# Patient Record
Sex: FEMALE | Race: WHITE
[De-identification: names, ages, dates, MRNs, and addresses within clinical notes are randomized per-mention and may not be internally consistent; named-entity substitution may affect disease eponyms.]

---

## 2018-12-14 ENCOUNTER — HOSPITAL ENCOUNTER (INPATIENT)
Dept: HOSPITAL 75 - ICU | Age: 68
LOS: 2 days | Discharge: HOME | DRG: 247 | End: 2018-12-16
Attending: INTERNAL MEDICINE | Admitting: INTERNAL MEDICINE
Payer: COMMERCIAL

## 2018-12-14 VITALS — DIASTOLIC BLOOD PRESSURE: 89 MMHG | SYSTOLIC BLOOD PRESSURE: 162 MMHG

## 2018-12-14 VITALS — SYSTOLIC BLOOD PRESSURE: 142 MMHG | DIASTOLIC BLOOD PRESSURE: 88 MMHG

## 2018-12-14 VITALS — SYSTOLIC BLOOD PRESSURE: 152 MMHG | DIASTOLIC BLOOD PRESSURE: 109 MMHG

## 2018-12-14 VITALS — DIASTOLIC BLOOD PRESSURE: 70 MMHG | SYSTOLIC BLOOD PRESSURE: 143 MMHG

## 2018-12-14 VITALS — SYSTOLIC BLOOD PRESSURE: 167 MMHG | DIASTOLIC BLOOD PRESSURE: 91 MMHG

## 2018-12-14 VITALS — DIASTOLIC BLOOD PRESSURE: 95 MMHG | SYSTOLIC BLOOD PRESSURE: 145 MMHG

## 2018-12-14 VITALS — DIASTOLIC BLOOD PRESSURE: 160 MMHG | SYSTOLIC BLOOD PRESSURE: 180 MMHG

## 2018-12-14 VITALS — DIASTOLIC BLOOD PRESSURE: 103 MMHG | SYSTOLIC BLOOD PRESSURE: 169 MMHG

## 2018-12-14 VITALS — DIASTOLIC BLOOD PRESSURE: 81 MMHG | SYSTOLIC BLOOD PRESSURE: 149 MMHG

## 2018-12-14 VITALS — DIASTOLIC BLOOD PRESSURE: 111 MMHG | SYSTOLIC BLOOD PRESSURE: 188 MMHG

## 2018-12-14 VITALS — HEIGHT: 64 IN | WEIGHT: 178 LBS | BODY MASS INDEX: 30.39 KG/M2

## 2018-12-14 VITALS — DIASTOLIC BLOOD PRESSURE: 83 MMHG | SYSTOLIC BLOOD PRESSURE: 144 MMHG

## 2018-12-14 VITALS — SYSTOLIC BLOOD PRESSURE: 161 MMHG | DIASTOLIC BLOOD PRESSURE: 134 MMHG

## 2018-12-14 VITALS — DIASTOLIC BLOOD PRESSURE: 99 MMHG | SYSTOLIC BLOOD PRESSURE: 168 MMHG

## 2018-12-14 VITALS — SYSTOLIC BLOOD PRESSURE: 127 MMHG | DIASTOLIC BLOOD PRESSURE: 89 MMHG

## 2018-12-14 DIAGNOSIS — E78.5: ICD-10-CM

## 2018-12-14 DIAGNOSIS — I21.4: Primary | ICD-10-CM

## 2018-12-14 DIAGNOSIS — I16.0: ICD-10-CM

## 2018-12-14 DIAGNOSIS — R74.8: ICD-10-CM

## 2018-12-14 DIAGNOSIS — F17.210: ICD-10-CM

## 2018-12-14 DIAGNOSIS — I25.10: ICD-10-CM

## 2018-12-14 DIAGNOSIS — J44.9: ICD-10-CM

## 2018-12-14 LAB
ALBUMIN SERPL-MCNC: 4 GM/DL (ref 3.2–4.5)
ALP SERPL-CCNC: 91 U/L (ref 40–136)
ALT SERPL-CCNC: 31 U/L (ref 0–55)
APTT BLD: 71 SEC (ref 24–35)
BASOPHILS # BLD AUTO: 0 10^3/UL (ref 0–0.1)
BASOPHILS NFR BLD AUTO: 0 % (ref 0–10)
BILIRUB SERPL-MCNC: 1.3 MG/DL (ref 0.1–1)
BUN/CREAT SERPL: 15
CALCIUM SERPL-MCNC: 9 MG/DL (ref 8.5–10.1)
CHLORIDE SERPL-SCNC: 107 MMOL/L (ref 98–107)
CO2 SERPL-SCNC: 22 MMOL/L (ref 21–32)
CREAT SERPL-MCNC: 0.67 MG/DL (ref 0.6–1.3)
EOSINOPHIL # BLD AUTO: 0 10^3/UL (ref 0–0.3)
EOSINOPHIL NFR BLD AUTO: 0 % (ref 0–10)
ERYTHROCYTE [DISTWIDTH] IN BLOOD BY AUTOMATED COUNT: 13.7 % (ref 10–14.5)
GFR SERPLBLD BASED ON 1.73 SQ M-ARVRAT: > 60 ML/MIN
GLUCOSE SERPL-MCNC: 129 MG/DL (ref 70–105)
HCT VFR BLD CALC: 45 % (ref 35–52)
HGB BLD-MCNC: 15.3 G/DL (ref 11.5–16)
INR PPP: 1.1 (ref 0.8–1.4)
LYMPHOCYTES # BLD AUTO: 1.8 X 10^3 (ref 1–4)
LYMPHOCYTES NFR BLD AUTO: 15 % (ref 12–44)
MANUAL DIFFERENTIAL PERFORMED BLD QL: NO
MCH RBC QN AUTO: 34 PG (ref 25–34)
MCHC RBC AUTO-ENTMCNC: 34 G/DL (ref 32–36)
MCV RBC AUTO: 100 FL (ref 80–99)
MONOCYTES # BLD AUTO: 0.9 X 10^3 (ref 0–1)
MONOCYTES NFR BLD AUTO: 7 % (ref 0–12)
NEUTROPHILS # BLD AUTO: 9.3 X 10^3 (ref 1.8–7.8)
NEUTROPHILS NFR BLD AUTO: 77 % (ref 42–75)
PLATELET # BLD: 205 10^3/UL (ref 130–400)
PMV BLD AUTO: 10.2 FL (ref 7.4–10.4)
POTASSIUM SERPL-SCNC: 4 MMOL/L (ref 3.6–5)
PROT SERPL-MCNC: 7 GM/DL (ref 6.4–8.2)
PROTHROMBIN TIME: 14.3 SEC (ref 12.2–14.7)
RBC # BLD AUTO: 4.47 10^6/UL (ref 4.35–5.85)
SODIUM SERPL-SCNC: 138 MMOL/L (ref 135–145)
WBC # BLD AUTO: 12.1 10^3/UL (ref 4.3–11)

## 2018-12-14 PROCEDURE — 80053 COMPREHEN METABOLIC PANEL: CPT

## 2018-12-14 PROCEDURE — 71045 X-RAY EXAM CHEST 1 VIEW: CPT

## 2018-12-14 PROCEDURE — 36415 COLL VENOUS BLD VENIPUNCTURE: CPT

## 2018-12-14 PROCEDURE — 85730 THROMBOPLASTIN TIME PARTIAL: CPT

## 2018-12-14 PROCEDURE — 94640 AIRWAY INHALATION TREATMENT: CPT

## 2018-12-14 PROCEDURE — 85347 COAGULATION TIME ACTIVATED: CPT

## 2018-12-14 PROCEDURE — 93458 L HRT ARTERY/VENTRICLE ANGIO: CPT

## 2018-12-14 PROCEDURE — 87081 CULTURE SCREEN ONLY: CPT

## 2018-12-14 PROCEDURE — 80061 LIPID PANEL: CPT

## 2018-12-14 PROCEDURE — 93005 ELECTROCARDIOGRAM TRACING: CPT

## 2018-12-14 PROCEDURE — 83880 ASSAY OF NATRIURETIC PEPTIDE: CPT

## 2018-12-14 PROCEDURE — 85027 COMPLETE CBC AUTOMATED: CPT

## 2018-12-14 PROCEDURE — 85610 PROTHROMBIN TIME: CPT

## 2018-12-14 PROCEDURE — 85025 COMPLETE CBC W/AUTO DIFF WBC: CPT

## 2018-12-14 PROCEDURE — 84484 ASSAY OF TROPONIN QUANT: CPT

## 2018-12-14 PROCEDURE — 93306 TTE W/DOPPLER COMPLETE: CPT

## 2018-12-14 RX ADMIN — SODIUM CHLORIDE SCH MLS/HR: 900 INJECTION, SOLUTION INTRAVENOUS at 19:31

## 2018-12-14 RX ADMIN — TICAGRELOR SCH MG: 90 TABLET ORAL at 22:32

## 2018-12-14 RX ADMIN — HEPARIN SODIUM AND DEXTROSE SCH MLS/HR: 5000; 5 INJECTION INTRAVENOUS at 19:00

## 2018-12-14 RX ADMIN — SODIUM CHLORIDE SCH MLS/HR: 900 INJECTION, SOLUTION INTRAVENOUS at 23:58

## 2018-12-14 RX ADMIN — NITROGLYCERIN SCH MLS/HR: 100 INJECTION, SOLUTION INTRAVENOUS at 15:43

## 2018-12-14 RX ADMIN — METOPROLOL TARTRATE SCH MG: 1 INJECTION, SOLUTION INTRAVENOUS at 23:20

## 2018-12-14 RX ADMIN — SODIUM CHLORIDE SCH MLS/HR: 900 INJECTION, SOLUTION INTRAVENOUS at 15:42

## 2018-12-14 RX ADMIN — METOPROLOL TARTRATE SCH MG: 1 INJECTION, SOLUTION INTRAVENOUS at 16:55

## 2018-12-14 RX ADMIN — IPRATROPIUM BROMIDE AND ALBUTEROL SULFATE SCH ML: .5; 3 SOLUTION RESPIRATORY (INHALATION) at 19:10

## 2018-12-14 RX ADMIN — METOPROLOL TARTRATE SCH MG: 1 INJECTION, SOLUTION INTRAVENOUS at 18:00

## 2018-12-14 RX ADMIN — ATORVASTATIN CALCIUM SCH MG: 80 TABLET, FILM COATED ORAL at 22:32

## 2018-12-14 RX ADMIN — IPRATROPIUM BROMIDE AND ALBUTEROL SULFATE SCH ML: .5; 3 SOLUTION RESPIRATORY (INHALATION) at 14:08

## 2018-12-14 NOTE — DIAGNOSTIC IMAGING REPORT
Indication: Wheezing



Portable chest 1:04 PM



Heart size and pulmonary vascularity are normal. Lungs are clear.

There are no effusions or pneumothoraces.



Impression: Negative chest.



Dictated by: 



  Dictated on workstation # [id number]

## 2018-12-14 NOTE — HISTORY & PHYSICAL-HOSPITALIST
History of Present Illness


HPI/Chief Complaint


CC: AMI





HPI: This is a 68-year-old white female of Dr. Solis who she really sees and 

rarely sees any Dr. since she doesn't have any medical problems who presents to 

[facility] ER with 1 week history of not feeling well and was found 

to have elevated troponin of 8.1 without EKG changes to suggest an acute ST 

elevation MI but she needed heart level of care cardiology consultation and 

cardiac catheterization with intervention so she was transferred via [facility] in an uncomplicated manner.  She did have hypertensive urgency 

requiring nitroglycerin drip and Lopressor IV aspirin and systolic blood 

pressure of 170 currently.  She does smoke and she does have coarse breath 

sounds throughout all fields on lung exam so do a nebs will be initiated.  

Smoking cessation counseled.  Cardiology has been consulted and will perform 

consultation at the bedside upon arrival.


Source:  patient, RN/MD


Exam Limitations:  no limitations


Date Seen


12/14/18


Time Seen by a Provider:  12:45


Attending Physician


Francine Medina DO


PCP


Kyle Luna MD


Referring Physician





Date of Admission


Dec 14, 2018 at 12:46





Home Medications & Allergies


Home Medications


Reviewed patient Home Medication Reconciliation performed by pharmacy 

medication reconciliations technician and/or nursing.


Patients Allergies have been reviewed.





Allergies





Allergies


Coded Allergies


  ibuprofen (Verified Allergy, Unknown, 12/14/18)








Past Medical-Social-Family Hx


Past Med/Social Hx:  Reviewed Nursing Past Med/Soc Hx, Reviewed and Corrections 

made


Patient Social History


Marrital Status:  


Employed/Student:  employed ([employer])


Alcohol Use:  Regular Use


Alcohol Beverage of Choice:  Beer


Smoking Status:  Current Everyday Smoker


Recent Foreign Travel:  No


Contact w/other who traveled:  No





Review of Systems


Constitutional:  see HPI, malaise, weakness


EENTM:  no symptoms reported


Respiratory:  short of breath


Cardiovascular:  no symptoms reported


Gastrointestinal:  loss of appetite


Genitourinary:  no symptoms reported


Musculoskeletal:  no symptoms reported


Skin:  no symptoms reported


Psychiatric/Neurological:  No Symptoms Reported


All Other Systems Reviewed


Negative Unless Noted:  Yes





Physical Exam


Physical Exam


Vital Signs


Capillary Refill :


Height, Weight, BMI


Height: '"


Weight: lbs. oz. kg;  BMI


Method:


General Appearance:  No Apparent Distress, WD/WN, Anxious, Chronically ill, 

Obese


Eyes:  Bilateral Eye Normal Inspection, Bilateral Eye PERRL


HEENT:  PERRL/EOMI, Normal ENT Inspection, Pharynx Normal


Neck:  Full Range of Motion, Normal Inspection, Non Tender, Supple, Carotid 

Bruit


Respiratory:  Chest Non Tender, No Accessory Muscle Use, No Respiratory Distress

, Crackles, Decreased Breath Sounds, Wheezing


Cardiovascular:  Regular Rate, Rhythm, No Edema, No Gallop, No JVD, No Murmur, 

Normal Peripheral Pulses


Gastrointestinal:  Normal Bowel Sounds, No Organomegaly, No Pulsatile Mass, Non 

Tender, Soft


Back:  Normal Inspection, No CVA Tenderness, No Vertebral Tenderness


Extremity:  Normal Capillary Refill, Normal Inspection, Normal Range of Motion, 

Non Tender, No Calf Tenderness, No Pedal Edema


Neurologic/Psychiatric:  Alert, Oriented x3, No Motor/Sensory Deficits, Normal 

Mood/Affect


Skin:  Normal Color, Warm/Dry


Lymphatic:  No Adenopathy





Results


Results/Procedures


Labs


Patient resulted labs reviewed.





Assessment/Plan


Admission Diagnosis


Assessment:


Acute myocardial infarction non-ST elevation MI with troponin of 8.0 at [facility]


Malignant hypertension requiring nitroglycerin drip and Lopressor IV


Smoker


Coarse breath sounds all fields on lung exam today





Plan:


Cardiology consultation is appreciated


Likely cardiac catheterization will need to be performed


Nothing by mouth


Nitroglycerin drip


[place] to


Aspirin


IV beta blockers


Doing nebs


Check chest x-ray


Smoking cessation


Admission Status:  Inpatient Order (span 2 midnights)


Reason for Inpatient Admission:  


Acute myocardial infarction for require cardiac catheterization with


intervention





Diagnosis/Problems


Diagnosis/Problems





(1) Non-ST elevated myocardial infarction


Status:  Acute


(2) Malignant hypertension


Status:  Acute


(3) Smoker


Status:  Chronic


(4) Wheezing


Status:  Acute











FRANCINE MEDINA DO Dec 14, 2018 12:59

## 2018-12-14 NOTE — XMS REPORT
[facility]

 Created on: 2018



Carolyne Arriaza

External Reference #: [id number]

: 1950

Sex: Female



Demographics







 Address  [address]  [address]

 

 Preferred Language  Unknown

 

 Marital Status  Unknown

 

 [employer] Affiliation  Unknown

 

 Race  Unknown

 

 Ethnic Group  Unknown





Author







 Author  CLAUDIO BANSAL

 

 [facility]

 

 Address  [address] [place]  [address]



 

 Phone  [phone]







Care Team Providers







 Care Team Member Name  Role  Phone

 

 CLAUDIO BANSAL  Unavailable  [phone]







PROBLEMS

Unknown Problems



ALLERGIES

No Information



ENCOUNTERS







 Encounter  Location  Date  Diagnosis

 

 [facility]  [id number] [place] [address]    Visit for TB skin test Z11.1







IMMUNIZATIONS

No Known Immunizations



SOCIAL HISTORY

Never Assessed



REASON FOR VISIT

tb skin test



PLAN OF CARE







 Activity  Details









  









 Follow Up  48-72 hours Reason:







VITAL SIGNS





MEDICATIONS

Unknown Medications



RESULTS

No Results



PROCEDURES







 Procedure  Date Ordered  Result  Body Site

 

 TB INTRADERMAL  2018  N/A   

 

 TB INTRADERMAL TEST  2018      







INSTRUCTIONS





MEDICATIONS ADMINISTERED

No Known Medications

## 2018-12-15 VITALS — SYSTOLIC BLOOD PRESSURE: 165 MMHG | DIASTOLIC BLOOD PRESSURE: 97 MMHG

## 2018-12-15 VITALS — DIASTOLIC BLOOD PRESSURE: 105 MMHG | SYSTOLIC BLOOD PRESSURE: 154 MMHG

## 2018-12-15 VITALS — DIASTOLIC BLOOD PRESSURE: 88 MMHG | SYSTOLIC BLOOD PRESSURE: 123 MMHG

## 2018-12-15 VITALS — SYSTOLIC BLOOD PRESSURE: 133 MMHG | DIASTOLIC BLOOD PRESSURE: 89 MMHG

## 2018-12-15 VITALS — DIASTOLIC BLOOD PRESSURE: 74 MMHG | SYSTOLIC BLOOD PRESSURE: 128 MMHG

## 2018-12-15 VITALS — DIASTOLIC BLOOD PRESSURE: 78 MMHG | SYSTOLIC BLOOD PRESSURE: 148 MMHG

## 2018-12-15 VITALS — SYSTOLIC BLOOD PRESSURE: 132 MMHG | DIASTOLIC BLOOD PRESSURE: 78 MMHG

## 2018-12-15 VITALS — SYSTOLIC BLOOD PRESSURE: 144 MMHG | DIASTOLIC BLOOD PRESSURE: 84 MMHG

## 2018-12-15 VITALS — SYSTOLIC BLOOD PRESSURE: 141 MMHG | DIASTOLIC BLOOD PRESSURE: 93 MMHG

## 2018-12-15 VITALS — DIASTOLIC BLOOD PRESSURE: 65 MMHG | SYSTOLIC BLOOD PRESSURE: 125 MMHG

## 2018-12-15 VITALS — SYSTOLIC BLOOD PRESSURE: 161 MMHG | DIASTOLIC BLOOD PRESSURE: 103 MMHG

## 2018-12-15 VITALS — SYSTOLIC BLOOD PRESSURE: 146 MMHG | DIASTOLIC BLOOD PRESSURE: 91 MMHG

## 2018-12-15 VITALS — SYSTOLIC BLOOD PRESSURE: 142 MMHG | DIASTOLIC BLOOD PRESSURE: 111 MMHG

## 2018-12-15 VITALS — DIASTOLIC BLOOD PRESSURE: 71 MMHG | SYSTOLIC BLOOD PRESSURE: 130 MMHG

## 2018-12-15 VITALS — SYSTOLIC BLOOD PRESSURE: 180 MMHG | DIASTOLIC BLOOD PRESSURE: 92 MMHG

## 2018-12-15 VITALS — SYSTOLIC BLOOD PRESSURE: 153 MMHG | DIASTOLIC BLOOD PRESSURE: 99 MMHG

## 2018-12-15 VITALS — SYSTOLIC BLOOD PRESSURE: 156 MMHG | DIASTOLIC BLOOD PRESSURE: 99 MMHG

## 2018-12-15 VITALS — SYSTOLIC BLOOD PRESSURE: 121 MMHG | DIASTOLIC BLOOD PRESSURE: 65 MMHG

## 2018-12-15 VITALS — DIASTOLIC BLOOD PRESSURE: 90 MMHG | SYSTOLIC BLOOD PRESSURE: 154 MMHG

## 2018-12-15 VITALS — SYSTOLIC BLOOD PRESSURE: 172 MMHG | DIASTOLIC BLOOD PRESSURE: 106 MMHG

## 2018-12-15 VITALS — SYSTOLIC BLOOD PRESSURE: 152 MMHG | DIASTOLIC BLOOD PRESSURE: 98 MMHG

## 2018-12-15 VITALS — DIASTOLIC BLOOD PRESSURE: 68 MMHG | SYSTOLIC BLOOD PRESSURE: 135 MMHG

## 2018-12-15 LAB
ALBUMIN SERPL-MCNC: 3.4 GM/DL (ref 3.2–4.5)
ALP SERPL-CCNC: 75 U/L (ref 40–136)
ALT SERPL-CCNC: 26 U/L (ref 0–55)
BASOPHILS # BLD AUTO: 0 10^3/UL (ref 0–0.1)
BASOPHILS NFR BLD AUTO: 0 % (ref 0–10)
BILIRUB SERPL-MCNC: 1.7 MG/DL (ref 0.1–1)
BUN/CREAT SERPL: 15
CALCIUM SERPL-MCNC: 8.8 MG/DL (ref 8.5–10.1)
CHLORIDE SERPL-SCNC: 108 MMOL/L (ref 98–107)
CHOLEST SERPL-MCNC: 171 MG/DL (ref ?–200)
CO2 SERPL-SCNC: 20 MMOL/L (ref 21–32)
CREAT SERPL-MCNC: 0.65 MG/DL (ref 0.6–1.3)
EOSINOPHIL # BLD AUTO: 0 10^3/UL (ref 0–0.3)
EOSINOPHIL NFR BLD AUTO: 0 % (ref 0–10)
ERYTHROCYTE [DISTWIDTH] IN BLOOD BY AUTOMATED COUNT: 13.6 % (ref 10–14.5)
GFR SERPLBLD BASED ON 1.73 SQ M-ARVRAT: > 60 ML/MIN
GLUCOSE SERPL-MCNC: 126 MG/DL (ref 70–105)
HCT VFR BLD CALC: 41 % (ref 35–52)
HDLC SERPL-MCNC: 48 MG/DL (ref 40–60)
HGB BLD-MCNC: 13.5 G/DL (ref 11.5–16)
LYMPHOCYTES # BLD AUTO: 1 X 10^3 (ref 1–4)
LYMPHOCYTES NFR BLD AUTO: 9 % (ref 12–44)
MANUAL DIFFERENTIAL PERFORMED BLD QL: NO
MCH RBC QN AUTO: 34 PG (ref 25–34)
MCHC RBC AUTO-ENTMCNC: 33 G/DL (ref 32–36)
MCV RBC AUTO: 102 FL (ref 80–99)
MONOCYTES # BLD AUTO: 1 X 10^3 (ref 0–1)
MONOCYTES NFR BLD AUTO: 9 % (ref 0–12)
NEUTROPHILS # BLD AUTO: 8.9 X 10^3 (ref 1.8–7.8)
NEUTROPHILS NFR BLD AUTO: 82 % (ref 42–75)
PLATELET # BLD: 180 10^3/UL (ref 130–400)
PMV BLD AUTO: 10.3 FL (ref 7.4–10.4)
POTASSIUM SERPL-SCNC: 4 MMOL/L (ref 3.6–5)
PROT SERPL-MCNC: 6 GM/DL (ref 6.4–8.2)
RBC # BLD AUTO: 4.02 10^6/UL (ref 4.35–5.85)
SODIUM SERPL-SCNC: 137 MMOL/L (ref 135–145)
TRIGL SERPL-MCNC: 87 MG/DL (ref ?–150)
VLDLC SERPL CALC-MCNC: 17 MG/DL (ref 5–40)
WBC # BLD AUTO: 10.9 10^3/UL (ref 4.3–11)

## 2018-12-15 PROCEDURE — 027135Z DILATION OF CORONARY ARTERY, TWO ARTERIES WITH TWO DRUG-ELUTING INTRALUMINAL DEVICES, PERCUTANEOUS APPROACH: ICD-10-PCS | Performed by: INTERNAL MEDICINE

## 2018-12-15 PROCEDURE — 4A023N7 MEASUREMENT OF CARDIAC SAMPLING AND PRESSURE, LEFT HEART, PERCUTANEOUS APPROACH: ICD-10-PCS | Performed by: INTERNAL MEDICINE

## 2018-12-15 PROCEDURE — B2111ZZ FLUOROSCOPY OF MULTIPLE CORONARY ARTERIES USING LOW OSMOLAR CONTRAST: ICD-10-PCS | Performed by: INTERNAL MEDICINE

## 2018-12-15 RX ADMIN — TICAGRELOR SCH MG: 90 TABLET ORAL at 08:25

## 2018-12-15 RX ADMIN — SODIUM CHLORIDE SCH MLS/HR: 900 INJECTION, SOLUTION INTRAVENOUS at 20:20

## 2018-12-15 RX ADMIN — METOPROLOL TARTRATE SCH MG: 1 INJECTION, SOLUTION INTRAVENOUS at 18:53

## 2018-12-15 RX ADMIN — IPRATROPIUM BROMIDE AND ALBUTEROL SULFATE SCH ML: .5; 3 SOLUTION RESPIRATORY (INHALATION) at 19:36

## 2018-12-15 RX ADMIN — IPRATROPIUM BROMIDE AND ALBUTEROL SULFATE SCH ML: .5; 3 SOLUTION RESPIRATORY (INHALATION) at 14:17

## 2018-12-15 RX ADMIN — TICAGRELOR SCH MG: 90 TABLET ORAL at 20:03

## 2018-12-15 RX ADMIN — SODIUM CHLORIDE SCH MLS/HR: 900 INJECTION, SOLUTION INTRAVENOUS at 20:19

## 2018-12-15 RX ADMIN — METOPROLOL TARTRATE SCH MG: 1 INJECTION, SOLUTION INTRAVENOUS at 13:20

## 2018-12-15 RX ADMIN — METOPROLOL TARTRATE SCH MG: 25 TABLET, FILM COATED ORAL at 20:03

## 2018-12-15 RX ADMIN — SODIUM CHLORIDE SCH MLS/HR: 900 INJECTION, SOLUTION INTRAVENOUS at 13:23

## 2018-12-15 RX ADMIN — METOPROLOL TARTRATE SCH MG: 1 INJECTION, SOLUTION INTRAVENOUS at 06:44

## 2018-12-15 RX ADMIN — SODIUM CHLORIDE SCH MLS/HR: 900 INJECTION, SOLUTION INTRAVENOUS at 08:26

## 2018-12-15 RX ADMIN — HEPARIN SODIUM AND DEXTROSE SCH MLS/HR: 5000; 5 INJECTION INTRAVENOUS at 03:40

## 2018-12-15 RX ADMIN — NITROGLYCERIN SCH MLS/HR: 100 INJECTION, SOLUTION INTRAVENOUS at 13:20

## 2018-12-15 RX ADMIN — METOPROLOL TARTRATE SCH MG: 1 INJECTION, SOLUTION INTRAVENOUS at 13:23

## 2018-12-15 RX ADMIN — IPRATROPIUM BROMIDE AND ALBUTEROL SULFATE SCH ML: .5; 3 SOLUTION RESPIRATORY (INHALATION) at 10:45

## 2018-12-15 RX ADMIN — ATORVASTATIN CALCIUM SCH MG: 80 TABLET, FILM COATED ORAL at 20:03

## 2018-12-15 NOTE — CARDIAC PROCEDURE NOTE-CS/ASA
Pre-Procedure Note


Pre-Op Procedure Note


H&P Reviewed


The H&P was reviewed, patient examined and no changes noted.


Date H&P Reviewed:  Dec 15, 2018


Time H&P Reviewed:  09:45





Conscious Sedation Pre-Proced


Time


09:45





ASA Score


3


For ASA 3 and 4: Consider anesthesia and medical clearance. Also, for 

patients with a history of failed moderate sedation consider anesthesia.

















Airway 


 


Lungs 


 


Heart 


 


 ASA score


 


 ASA 1: a normal healthy patient


 


 ASA 2:  a patient with a mild systemic disease (mid diabetes, controlled 

hypertension, obesity 


 


 ASA 3:  a patient with a severe systemic disease that limits activity  (angina

, COPD, prior Myocardial infarction)


 


 ASA 4:  a patient with an incapacitating disease that is a constant threat to 

life (CHF, renal failure)


 


 ASA 5:  a moribund patient not expected to survive 24 hrs.  (ruptured aneurysm)


 


 ASA 6:  a declared brain dead patient whose organs are being harvested.


 


 For emergent operations, add the letter E after the classification











Mallampati Classification


Grade 1





Sedation Plan


Analgesia, Amnesia, Plan communicated to team members, Discussed options with 

patient/fam, Discussed risks with patient/fam


The patient is an appropriate candidate to undergo the planned procedure, 

sedation, and anesthesia.





The patient immediately re-assessed prior to indication.











KAREN PENA MD Dec 15, 2018 10:17

## 2018-12-15 NOTE — PROGRESS NOTE-HOSPITALIST
Subjective


HPI/CC On Admission


Date Seen by Provider:  Dec 15, 2018


Time Seen by Provider:  09:00


CC: AMI





HPI: This is a 68-year-old white female of Dr. Solis who she really sees and 

rarely sees any Dr. since she doesn't have any medical problems who presents to 

[facility] ER with 1 week history of not feeling well and was found 

to have elevated troponin of 8.1 without EKG changes to suggest an acute ST 

elevation MI but she needed heart level of care cardiology consultation and 

cardiac catheterization with intervention so she was transferred via [facility] in an uncomplicated manner.  She did have hypertensive urgency 

requiring nitroglycerin drip and Lopressor IV aspirin and systolic blood 

pressure of 170 currently.  She does smoke and she does have coarse breath 

sounds throughout all fields on lung exam so do a nebs will be initiated.  

Smoking cessation counseled.  Cardiology has been consulted and will perform 

consultation at the bedside upon arrival.


Subjective/Events-last exam


Patient has been pain free overnight.  She does relate a history of having for 

the last 2 or 3 days chest discomfort and tightness and shortness of air that 

starts occurring about 10 o'clock in the morning.  She has had none of this 

since admission.  EKG remains without ST segment elevation or depression.  

Patient is having occasional PVCs.  Systolic blood pressures running in the 

150s.  She says that her her breathing is better today.  She asks when she may 

go home.





Review of Systems


Musculoskeletal:  other (General stiffness and discomfort from laying in bed)





Objective


Exam


Vital Signs





Vital Signs








  Date Time  Temp Pulse Resp B/P (MAP) Pulse Ox O2 Delivery O2 Flow Rate FiO2


 


12/15/18 08:13      Room Air  


 


12/15/18 08:00  75 33 165/97 ([phone] 96   


 


12/15/18 03:42 98.6       


 


18 16:00       2.00 





Capillary Refill :


General Appearance:  No Apparent Distress


HEENT:  Other (Poor dentition)


Neck:  Normal Inspection, Non Tender, Supple


Respiratory:  No Accessory Muscle Use, No Respiratory Distress, Expiration, 

Inspiration, Rhonci


Cardiovascular:  Regular Rate, Rhythm, Systolic Murmur (2/6 systolic murmur), 

Gallop/S3


Gastrointestinal:  Normal Bowel Sounds, No Pulsatile Mass, Non Tender, Soft


Rectal:  Deferred


Back:  Normal Inspection


Extremity:  No Pedal Edema


Neurologic/Psychiatric:  Alert, Oriented x3, No Motor/Sensory Deficits, Normal 

Mood/Affect, CNs II-XII Norm as Tested


Skin:  Normal Color, Warm/Dry





Results/Procedures


Lab


Laboratory Tests


18 15:31








12/15/18 04:33








Patient resulted labs reviewed.





Assessment/Plan


Assessment and Plan


Assess & Plan/Chief Complaint


Non-ST segment elevation MI with a troponin that is increased to 16.1


Tobaccoism non-curtailed


Hypertensive emergency improved on nitro drip


Probable COPD


Elevated liver function tests


 daily alcohol use





Plan check lipid profile, heart catheter today





Diagnosis/Problems


Diagnosis/Problems





(1) Non-ST elevated myocardial infarction


Status:  Acute


(2) Wheezing


Status:  Acute


(3) Smoker


Status:  Chronic


(4) Malignant hypertension


Status:  Acute


(5) Elevated liver function tests


Status:  Acute


(6) Habitual alcohol use


Status:  Chronic





Clinical Quality Measures


DVT/VTE Risk/Contraindication:


Risk Factor Score Per Nursin


RFS Level Per Nursing on Admit:  1=Low/No VTE PPX











MARIA DOLORES ALEXANDER MD Dec 15, 2018 09:07

## 2018-12-15 NOTE — CORONARY ANGIOGRAPHY & PCI
Coronary Angiography & PCI


DATE OF PROCEDURE: 12/15/18 





INDICATION: Non-STEMI.





PREOPERATIVE DIAGNOSIS: Non-STEMI.





POSTOPERATIVE DIAGNOSIS: Severe proximal RCA and mid left circumflex artery 

stenosis, treated successfully with drug-eluting stents. 





HISTORY: This is a 68-year-old lady with history of active smoking and non-

STEMI.  Therefore, the patient was scheduled for coronary angiography. 





PROCEDURES PERFORMED: 


1.Coronary angiography. 


2.Left heart catheterization. 


3.PCI to the proximal RCA.


4.  PCI to mid left circumflex artery.





COMPLICATIONS: None. 


SPECIMENS: None. 


ESTIMATED BLOOD LOSS: 10 mL


ANESTHESIA: Conscious sedation


ANTICOAGULATION: IV heparin


CONTRAST: 204 mL. 


FLUOROSCOPY: 


FLOUROSCOPY DOSE: 1503 mgy.





PROCEDURE DETAILS: The patient is a 68 female  and was brought to the cath lab 

after informed consent was taken. All the risks and complications were 

explained in detail; this included the risk of bleeding, vascular damage, stroke

, MI and even death. The patient was draped and prepped in the usual sterile 

fashion.  Access was gained in the right radial artery with a 6 Anguillan sheath.  

Coronary angiography and left heart catheterization was performed with the 

Tiger catheter.





FINDINGS: 


1.Left main: Patent.


2.LAD: Mild to moderate diffuse disease.  No significant focal stenosis is 

noted.  Transapical vessel.


3.Left circumflex artery: Subtotal occlusion in the mid left circumflex artery.

  Stenosis severity 99 percent.  RENATA 2 flow.  Moderate ostial stenosis.


4.RCA: Long segment of severe disease.  Stenosis severity 80 percent.


5.Left heart catheterization: LV pressure 111/6 mmHg.  LVEDP 19 mmHg.  Aortic 

pressure 126/77 mmHg.  Normal LV function with no wall motion abnormalities.  

No gradient across the aortic valve.





RECOMMENDATIONS:


PCI to proximal RCA is recommended.


PCI to mid left circumflex artery is recommended.





INTERVENTION DETAILS:


JR4 guide catheter with sideholes, whisper extra-support guidewire and IV 

heparin for anticoagulation.  One ACT was done which was over 200 seconds.  

Patient was given bolus of Brilinta yesterday and continued on aspirin and 

Brilinta.  Patient was given heparin infusion overnight.  The lesion in the 

proximal RCA was crossed with the whisper wire.  The tip of the whisper wire 

was placed in the distal RCA.  The lesion was direct stented with a Xience 

Shelbie 2.5 x 38 mm drug-eluting stent.  This was deployed at 16 bhupinder at 60 

seconds.  Postdilatation was done with an NC Quantum 3.0 x 30 mm balloon.  2 

inflations were done at 16 bhupinder each.  Excellent results with no residual 

stenosis and RENATA-3 flow.





JL 3.5 guide catheter, whisper extra-support guidewire.  The lesion in the mid 

left circumflex artery was crossed with the whisper wire.  Direct stenting was 

done with a Xience Shelbie 2.25 x 12 mm stent which was deployed at 11 

atmospheres for 24 seconds.  Postdilatation was done with an NC Quantum 2.75 x 

8 mm balloon.  2 inflations were done.  The first inflation in the midsegment 

was for 14 bhupinder for 22 seconds and the second inflation was for 16 bhupinder for 38 

seconds.  Excellent angiographic results with no residual stenosis and RENATA-3 

flow.  Mild to moderate ostial stenosis was left alone.  Patient tolerated 

procedure well and did not have any complication.  Radial artery was closed 

with a vascular band.





CONCLUSIONS: 


1.  PCI to the proximal RCA and mid left circumflex artery with drug-eluting 

stents.


2.  Dual antiplatelet therapy for at least 1 year.  Lisinopril, beta blocker 

and high-dose atorvastatin.


3.  Observe overnight in the ICU and possible discharge tomorrow.


4.  Smoking cessation and healthy living was strongly recommended.   





NATHAN Calhoun MD, FACP, FACC, [facility]


Interventional Cardiology











KAREN CALHOUN MD Dec 15, 2018 11:36

## 2018-12-15 NOTE — CONSULTATION-CARDIOLOGY
HPI-Cardiology


Cardiology Consultation:


Date of Consultation


12/15/18


Date of Admission





Attending Physician


Francine Medina DO


Admitting Physician


Kyle Luna MD


Consulting Physician


KAREN CALHOUN MD





HPI:


Time Seen by a Provider:  09:45


Chief Complaint:


Chest heaviness


This is a 68-year-old lady who has history of active smoking and untreated 

hyperlipidemia.  She denies any other cardiac or medical conditions.  She is 

complaining of chest heaviness with jaw discomfort and presented to [facility].  She was found to have significant hypertension and positive 

troponin and transferred to our hospital.  The chest heaviness is substernal 

with no significant radiation however associated with jaw discomfort.  No 

significant associated syncope, near-syncope or palpitations.  Mild shortness 

of breath.  Mild to moderate intensity with no relieving or exacerbating 

factors.





Review of Systems-Cardiology


Review of Systems


Constitutional:  As described under HPI; No As described under HPI, No no 

symptoms reported, No chills, No fever, No lightheadedness


Eyes:  No As described under HPI, No no symptoms reported, No blindness, No 

blurred vision, No contact lenses, No drainage, No decreased acuity, No foreign 

body sensation, No pain, No vision change


Ears/Nose/Throat:  No As described under HPI, No no symptoms reported, No 

chronic hearing loss, No ear discharge, No ear pain, No nasal drainage, No 

ulcerations


Respiratory:  No no symptoms reported; As described under HPI; No As described 

under HPI, No cough, No orthopnea, No shortness of breath, No SOB with excertion


Cardiovascular:  No no symptoms reported; As described under HPI; No As 

described under HPI; chest pain; No edema, No irregular heart rate, No 

lightheadedness, No palpitations


Gastrointestinal:  No no symptoms reported, No As described under HPI, No 

abdomen distended, No abdominal pain, No blood streaked bowels, No constipation

, No diarrhea, No nausea, No vomiting, No stool coloration changes


Genitourinary:  No As described under HPI, No burning, No dysuria, No discharge

, No frequency, No flank pain, No hematuria, No urgency


Pregnant:  Yes


Pregnant:  No


Skin:  No rash, No skin related problems, No ulcerations


Psychiatric/Neurological:  No anxiety, No depression, No seizure, No focal 

weakness, No syncope


Hematologic:  No bleeding abnormalities





All Other Systems Reviewed


Negative Unless Noted:  Yes





PMH-Social-Family Hx


Patient Social History


Marrital Status:  


Employed/Student:  employed ([employer])


Alcohol Use:  Regular Use


Recreational Drug Use:  No


Smoking Status:  Current Everyday Smoker


Type Used:  Cigarettes


Recent Foreign Travel:  No


Recent Infectious Disease Expo:  No


Hospitalization with Isolation:  Denies


Physical Abuse Screen:  No


Sexual Abuse:  No





Past Medical History


PMH


As described under Assessment.





Allergies and Home Medications


Allergies


Coded Allergies:  


     ibuprofen (Verified  Allergy, Intermediate, HIVES, pt takes ASA at home, )





Patient Home Medication List


Home Medication List Reviewed:  Yes





Physical Exam-Cardiology


Physical Exam


Vital Signs/I&O











 12/15/18 12/15/18 12/15/18 12/15/18





 09:00 11:49 12:00 12:00


 


Temp   98.9 


 


Pulse 102 80  


 


Resp 21   


 


B/P (MAP) 180/92 (121)   


 


Pulse Ox 97   


 


O2 Delivery Room Air  Room Air Room Air


 


    





 12/15/18 12/15/18 12/15/18 12/15/18





 12:00 12:15 12:30 12:39


 


Pulse 83 89 78 76


 


Resp 24 24 32 


 


B/P (MAP) 172/106 (128) 153/99 (117) 161/103 (122) 


 


Pulse Ox 96 92 97 


 


O2 Delivery Room Air Room Air Room Air 





 12/15/18 12/15/18 12/15/18 12/15/18





 12:45 13:00 13:15 14:00


 


Pulse 77 73 83 72


 


Resp 20 26 19 29


 


B/P (MAP) 142/111 (121) 154/90 (111) 152/98 (116) 144/84 (104)


 


Pulse Ox 98 96 97 96


 


O2 Delivery Room Air Room Air Room Air Room Air





 12/15/18 12/15/18 12/15/18 12/15/18





 14:18 15:00 15:15 16:00


 


Pulse  94 76 


 


Resp  19 12 


 


B/P (MAP)  154/105 (121) 133/89 (104) 


 


Pulse Ox  92 96 


 


O2 Delivery Room Air Room Air Room Air Room Air





 12/15/18 12/15/18 12/15/18 12/15/18





 16:00 16:00 17:00 19:36


 


Temp 99.1   


 


Pulse  75 82 


 


Resp  10  


 


B/P (MAP)  132/78 (96) 156/99 (118) 


 


Pulse Ox  96 96 95


 


O2 Delivery Room Air Room Air Room Air Room Air














 12/15/18





 00:00


 


Intake Total 1450 ml


 


Output Total 600 ml


 


Balance 850 ml





Capillary Refill :


Constitutional:  appears stated age, AAO x 3; No apparent distress; well-

developed, well-nourished


HEENT:  PERRL; No normal ENT inspection, No TMs normal, No pharynx normal, No 

scleral icterus (R), No scleral icterus (L), No pale conjunctivae (R), No pale 

conjunctivae (L), No photophobia, No TM abnormal (R), No TM abnormal (L), No 

pharyngeal erythema, No tonsillar exudate, No other, No discharge, No EOMI; 

hearing is well preserved; No hard of hearing; oral hygience is good; No 

ulceration, No xanthelasmas are seen


Neck:  No non-tender, No full range of motion, No supple, No normal inspection, 

No carotid bruit, No limited range of motion, No lymphadenopathy (R), No 

lymphadenopathy (L), No tender lateral, No tender midline, No thyromegaly, No 

other; carotid pulses are 2 + bilaterally; No with good upstrokes


Respiratory:  No accessory muscle use, No respiratory distress, No chest tender

, No chest expansion is symmetric; chest is bilaterally symmetric; No lungs 

clear to percussion; lungs clear to auscultation; No crackles, No rhonchi, No 

rales, No stridor, No wheezing, No pleural rub, No other


Cardiovascular:  regular rate-rhythm, S1 and S2


Gastrointestinal:  No tender, No soft, No round, No distended, No pulsatile mass

, No organomegaly, No guarding, No rebound, No tenderness, No hernia, No mass, 

No audible bowel sounds, No abnormal bowel sounds, No abdominal bruits, No 

spleenomegaly, No other


Rectal:  deferred


Extremities:  No normal range of motion, No non-tender, No normal inspection, 

No pedal edema, No calf tenderness, No normal capillary refill, No pelvis stable

, No calf tenderness, No inflammation, No pedal edema, No slow capillary refill

, No swelling, No other, No abrasion, No clubbing, No cyanosis, No ecchymosis, 

No laceration, No no lower extremity edema bilateral, No significant edema, No 

tenderness, No wound


Neurologic/Psychiatric:  no motor/sensory deficits, alert, normal mood/affect, 

oriented x 3, power is 5/5 both on sides


Skin:  No normal color, No warm/dry, No cyanosis, No cool, No diaphoresis, No 

damp, No ecchymosis, No jaundice, No mottled, No pallor, No rash, No tattoos/

piercings, No ulcerations, No rash on exposed areas, No ulcerations on exposed 

areas, No other





Data Review


Labs


Laboratory Tests


18 21:06: Activated Partial Thromboplast Time 47H


12/15/18 04:33: 


Activated Partial Thromboplast Time 111H, White Blood Count 10.9, Red Blood 

Count 4.02L, Hemoglobin 13.5, Hematocrit 41, Mean Corpuscular Volume 102H, Mean 

Corpuscular Hemoglobin 34, Mean Corpuscular Hemoglobin Concent 33, Red Cell 

Distribution Width 13.6, Platelet Count 180, Mean Platelet Volume 10.3, 

Neutrophils (%) (Auto) 82H, Lymphocytes (%) (Auto) 9L, Monocytes (%) (Auto) 9, 

Eosinophils (%) (Auto) 0, Basophils (%) (Auto) 0, Neutrophils # (Auto) 8.9H, 

Lymphocytes # (Auto) 1.0, Monocytes # (Auto) 1.0, Eosinophils # (Auto) 0.0, 

Basophils # (Auto) 0.0, Sodium Level 137, Potassium Level 4.0, Chloride Level 

108H, Carbon Dioxide Level 20L, Anion Gap 9, Blood Urea Nitrogen 10, Creatinine 

0.65, Estimat Glomerular Filtration Rate > 60, BUN/Creatinine Ratio 15, Glucose 

Level 126H, Calcium Level 8.8, Corrected Calcium 9.3, Total Bilirubin 1.7H, 

Aspartate Amino Transf (AST/SGOT) 69H, Alanine Aminotransferase (ALT/SGPT) 26, 

Alkaline Phosphatase 75, Troponin I 16.19*H, B-Type Natriuretic Peptide 618.0H, 

Total Protein 6.0L, Albumin 3.4, Triglycerides Level 87, Cholesterol Level 171, 

LDL Cholesterol Direct 120, VLDL Cholesterol 17, HDL Cholesterol 48





Microbiology


18 MRSA Screen - Final, Complete


           MRSA not isolated





ECG Impression


ECG


Initial ECG Rhythm:  Normal Sinus


Initial ECG Impression:  Normal





A/P-Cardiology


Assessment/Admission Diagnosis


Non-STEMI,


Severe hypertension,


Untreated hyperlipidemia,


Active smoking.





Plan


Non-STEMIaspirin, Brilinta, heparin.  Coronary angiography today.  All risks 

and complication were explained in detail.  Informed consent was taken.





Smokingsmoking cessation was strongly recommended.





Severe hypertensionwas on nitroglycerin infusion.  Start by mouth medications.

  Beta blocker, lisinopril.





Untreated hyperlipidemiastart on high-dose Lipitor.








Thank you for your consultation. Please call me if you have any questions.








NATHAN Calhoun MD, FACP, FACC, FSCAI, FHRS, CCDS


Interventional Cardiology


Cardiac Electrophysiology


Vascular Medicine and Endovascular Interventions





Clinical Quality Measures


DVT/VTE Risk/Contraindication:


Risk Factor Score Per Nursin


RFS Level Per Nursing on Admit:  1=Low/No VTE PPX











KAREN CALHOUN MD Dec 15, 2018 10:17

## 2018-12-16 VITALS — DIASTOLIC BLOOD PRESSURE: 108 MMHG | SYSTOLIC BLOOD PRESSURE: 183 MMHG

## 2018-12-16 VITALS — SYSTOLIC BLOOD PRESSURE: 172 MMHG | DIASTOLIC BLOOD PRESSURE: 96 MMHG

## 2018-12-16 VITALS — DIASTOLIC BLOOD PRESSURE: 109 MMHG | SYSTOLIC BLOOD PRESSURE: 167 MMHG

## 2018-12-16 VITALS — DIASTOLIC BLOOD PRESSURE: 75 MMHG | SYSTOLIC BLOOD PRESSURE: 126 MMHG

## 2018-12-16 VITALS — DIASTOLIC BLOOD PRESSURE: 96 MMHG | SYSTOLIC BLOOD PRESSURE: 168 MMHG

## 2018-12-16 VITALS — SYSTOLIC BLOOD PRESSURE: 174 MMHG | DIASTOLIC BLOOD PRESSURE: 108 MMHG

## 2018-12-16 VITALS — SYSTOLIC BLOOD PRESSURE: 182 MMHG | DIASTOLIC BLOOD PRESSURE: 102 MMHG

## 2018-12-16 VITALS — DIASTOLIC BLOOD PRESSURE: 120 MMHG | SYSTOLIC BLOOD PRESSURE: 189 MMHG

## 2018-12-16 VITALS — SYSTOLIC BLOOD PRESSURE: 170 MMHG | DIASTOLIC BLOOD PRESSURE: 104 MMHG

## 2018-12-16 LAB
ALBUMIN SERPL-MCNC: 3.7 GM/DL (ref 3.2–4.5)
ALP SERPL-CCNC: 81 U/L (ref 40–136)
ALT SERPL-CCNC: 35 U/L (ref 0–55)
BILIRUB SERPL-MCNC: 1.2 MG/DL (ref 0.1–1)
BUN/CREAT SERPL: 16
CALCIUM SERPL-MCNC: 9 MG/DL (ref 8.5–10.1)
CHLORIDE SERPL-SCNC: 108 MMOL/L (ref 98–107)
CO2 SERPL-SCNC: 20 MMOL/L (ref 21–32)
CREAT SERPL-MCNC: 0.67 MG/DL (ref 0.6–1.3)
ERYTHROCYTE [DISTWIDTH] IN BLOOD BY AUTOMATED COUNT: 13.6 % (ref 10–14.5)
GFR SERPLBLD BASED ON 1.73 SQ M-ARVRAT: > 60 ML/MIN
GLUCOSE SERPL-MCNC: 104 MG/DL (ref 70–105)
HCT VFR BLD CALC: 40 % (ref 35–52)
HGB BLD-MCNC: 13.3 G/DL (ref 11.5–16)
MCH RBC QN AUTO: 34 PG (ref 25–34)
MCHC RBC AUTO-ENTMCNC: 33 G/DL (ref 32–36)
MCV RBC AUTO: 102 FL (ref 80–99)
PLATELET # BLD: 178 10^3/UL (ref 130–400)
PMV BLD AUTO: 10.4 FL (ref 7.4–10.4)
POTASSIUM SERPL-SCNC: 4.2 MMOL/L (ref 3.6–5)
PROT SERPL-MCNC: 6.5 GM/DL (ref 6.4–8.2)
RBC # BLD AUTO: 3.92 10^6/UL (ref 4.35–5.85)
SODIUM SERPL-SCNC: 138 MMOL/L (ref 135–145)
WBC # BLD AUTO: 9.4 10^3/UL (ref 4.3–11)

## 2018-12-16 RX ADMIN — METOPROLOL TARTRATE SCH MG: 25 TABLET, FILM COATED ORAL at 08:05

## 2018-12-16 RX ADMIN — IPRATROPIUM BROMIDE AND ALBUTEROL SULFATE SCH ML: .5; 3 SOLUTION RESPIRATORY (INHALATION) at 09:36

## 2018-12-16 RX ADMIN — TICAGRELOR SCH MG: 90 TABLET ORAL at 08:05

## 2018-12-16 RX ADMIN — NITROGLYCERIN SCH MLS/HR: 100 INJECTION, SOLUTION INTRAVENOUS at 11:17

## 2018-12-16 RX ADMIN — SODIUM CHLORIDE SCH MLS/HR: 900 INJECTION, SOLUTION INTRAVENOUS at 11:17

## 2018-12-16 NOTE — DISCHARGE SUMMARY-HOSPITALIST
Diagnosis/Chief Complaint


Date of Admission


Dec 14, 2018 at 12:46


Date of Discharge


2018


Discharge Date:  Dec 16, 2018


Discharge Time:  12:00


Admission Diagnosis


Assessment:


Acute myocardial infarction non-ST elevation MI with troponin of 8.0 at [facility]


Malignant hypertension requiring nitroglycerin drip and Lopressor IV


Smoker


Coarse breath sounds all fields on lung exam today





Plan:


Cardiology consultation is appreciated


Likely cardiac catheterization will need to be performed


Nothing by mouth


Nitroglycerin drip


[place] to


Aspirin


IV beta blockers


Doing nebs


Check chest x-ray


Smoking cessation


Discharge Diagnosis


non-ST segment elevation MI secondary to occlusive coronary artery disease 

status post drug-eluting stent placement to the RCA and mid circumflex


Hypertension


Tobaccoism


Hyperlipidemia





(1) Non-ST elevated myocardial infarction


Status:  Acute


(2) Wheezing


Status:  Acute


(3) Smoker


Status:  Chronic


(4) Malignant hypertension


Status:  Acute


(5) Elevated liver function tests


Status:  Acute


(6) Habitual alcohol use


Status:  Chronic





Discharge Summary


Procedures/Consulations


DR. Calhoun- Heart Cath , echocardiogram





Discharge Physical Exam


Allergies:  


Coded Allergies:  


     ibuprofen (Verified  Allergy, Intermediate, HIVES, pt takes ASA at home, )


Vitals & I&Os





Vital Signs








  Date Time  Temp Pulse Resp B/P (MAP) Pulse Ox O2 Delivery O2 Flow Rate FiO2


 


18 09:36     97 Room Air  


 


18 08:08 98.0       


 


18 07:00  87 18 182/102 (128)    


 


18 16:00       2.00 








General Appearance:  No Apparent Distress, WD/WN


HEENT:  Other (oor dentition)


Respiratory:  Chest Non Tender, Lungs Clear, No Accessory Muscle Use, No 

Respiratory Distress


Cardiovascular:  Regular Rate, Rhythm, No Gallop, No Murmur, Normal Peripheral 

Pulses


Gastrointestinal:  Normal Bowel Sounds, Non Tender, Soft


Extremity:  Normal Capillary Refill, Normal Inspection, Normal Range of Motion, 

Non Tender, No Calf Tenderness


Skin:  Normal Color, Warm/Dry


Neurologic/Psychiatric:  Alert, Oriented x3, No Motor/Sensory Deficits, Normal 

Mood/Affect, CNs II-XII Norm as Tested





Hospital Course


This is a 68-year-old white female who presented to the emergency room with 

complaints of chest pain. troponin peaked at 16.  Heart catheter was done which 

showed occlusive diseaseof the right coronary artery and mid circumflex artery.

  Drug eluding stents were placed without complication.  The patient did have 

some wheezing initially on presentation this is resolved at the time of 

discharge. This patient was counseled heavily on cessation of smoking.  

Ejection fraction was preserved.  EKG showed no evidence of ST segment changes.

  The patient is improved and discharged in stable condition


Labs (last 24 hrs)


Laboratory Tests


18 05:00: 


White Blood Count 9.4, Red Blood Count 3.92L, Hemoglobin 13.3, Hematocrit 40, 

Mean Corpuscular Volume 102H, Mean Corpuscular Hemoglobin 34, Mean Corpuscular 

Hemoglobin Concent 33, Red Cell Distribution Width 13.6, Platelet Count 178, 

Mean Platelet Volume 10.4, Sodium Level 138, Potassium Level 4.2, Chloride 

Level 108H, Carbon Dioxide Level 20L, Anion Gap 10, Blood Urea Nitrogen 11, 

Creatinine 0.67, Estimat Glomerular Filtration Rate > 60, BUN/Creatinine Ratio 

16, Glucose Level 104, Calcium Level 9.0, Corrected Calcium 9.2, Total 

Bilirubin 1.2H, Aspartate Amino Transf (AST/SGOT) 47H, Alanine Aminotransferase 

(ALT/SGPT) 35, Alkaline Phosphatase 81, Troponin I 13.72*H, B-Type Natriuretic 

Peptide 454.8H, Total Protein 6.5, Albumin 3.7





Microbiology


18 MRSA Screen - Final, Complete


           MRSA not isolated


Patient resulted labs reviewed.


Pending Labs


Laboratory Tests


18 05:00: 


White Blood Count 9.4, Red Blood Count 3.92, Hemoglobin 13.3, Hematocrit 40, 

Mean Corpuscular Volume 102, Mean Corpuscular Hemoglobin 34, Mean Corpuscular 

Hemoglobin Concent 33, Red Cell Distribution Width 13.6, Platelet Count 178, 

Mean Platelet Volume 10.4, Sodium Level 138, Potassium Level 4.2, Chloride 

Level 108, Carbon Dioxide Level 20, Anion Gap 10, Blood Urea Nitrogen 11, 

Creatinine 0.67, Estimat Glomerular Filtration Rate > 60, BUN/Creatinine Ratio 

16, Glucose Level 104, Calcium Level 9.0, Corrected Calcium 9.2, Total 

Bilirubin 1.2, Aspartate Amino Transf (AST/SGOT) 47, Alanine Aminotransferase (

ALT/SGPT) 35, Alkaline Phosphatase 81, Troponin I 13.72, B-Type Natriuretic 

Peptide 454.8, Total Protein 6.5, Albumin 3.7


Imaging:  Reviewed Imaging Report





Discussion & Recommendations


Discharge Planning:  >30 minutes discharge planning





Discharge


Home Medications:





Active Scripts


Active


Aspirin EC (Aspirin) 81 Mg Tablet.dr 81 Mg PO DAILY 30 Days


Lisinopril 20 Mg Tablet 20 Mg PO DAILY@0900 30 Days


Metoprolol Tartrate 25 Mg Tablet 25 Mg PO BID 30 Days


Atorvastatin Calcium 80 Mg Tablet 80 Mg PO HS 30 Days


Brilinta (Ticagrelor) 90 Mg Tablet 90 Mg PO BID 30 Days


     do not stop this medication without consultation with your physician


Reported


Cranberry Tablet (Cranberry Conc/C/Bacill Coag) 1 Each Tablet 2 Each PO DAILY


Vitamin D-3 (Cholecalciferol (Vitamin D3)) 2,000 Unit Tablet 2,000 Unit PO DAILY


Aspirin 325 Mg Tablet 325 Mg PO DAILY





Condition at discharge


stable


Instructions to patient/family


Please see electronic discharge instructions given to patient.





Clinical Quality Measures


DVT/VTE Risk/Contraindication:


Risk Factor Score Per Nursin


RFS Level Per Nursing on Admit:  1=Low/No VTE PPX











MARIA DOLORES ALEXANDER MD Dec 16, 2018 10:38

## 2019-02-04 ENCOUNTER — HOSPITAL ENCOUNTER (OUTPATIENT)
Dept: HOSPITAL 75 - RAD | Age: 69
End: 2019-02-04
Attending: INTERNAL MEDICINE
Payer: COMMERCIAL

## 2019-02-04 DIAGNOSIS — I10: Primary | ICD-10-CM

## 2019-02-04 PROCEDURE — 93975 VASCULAR STUDY: CPT

## 2019-02-04 NOTE — DIAGNOSTIC IMAGING REPORT
PROCEDURE: US DOPPLER ABD/COMPLETE



INDICATION:  Hypertension



TECHNIQUE:

Multiple real-time grayscale sonographic images, color and duplex

Doppler images were obtained of the urinary system.



FINDINGS:

The aortic velocity is 78 cm/sec.



The RIGHT kidney measures 10.3 x 5.5 x 6.1 cm. The right renal

parenchyma and collecting system appear unremarkable. 

The right renal artery is visualized in its proximal, mid and

distal aspect.  The maximum renal artery velocity is 58cm/sec. 

The maximum renal artery/aortic ratio is 0.74. 



The LEFT kidney measures 12.4 x 6.0 x 5.3 cm. The left renal

parenchyma and collecting system appear unremarkable.

The left renal artery is visualized in its proximal, mid and

distal aspect.  The maximum renal artery velocity is 39cm/sec. 

The maximum renal artery/aortic ratio is 0.5.



The urinary bladder is unremarkable.



IMPRESSION:

1. Unremarkable renal ultrasound with doppler.





(RA/AO ratios > 3.0 may suggest potential hemodynamically

significant stenosis.)



Dictated by: 



  Dictated on workstation # [id number]

## 2019-02-27 ENCOUNTER — HOSPITAL ENCOUNTER (OUTPATIENT)
Dept: HOSPITAL 75 - LAB | Age: 69
End: 2019-02-27
Attending: INTERNAL MEDICINE
Payer: COMMERCIAL

## 2019-02-27 DIAGNOSIS — I10: Primary | ICD-10-CM

## 2019-02-27 LAB
ALBUMIN SERPL-MCNC: 4.4 GM/DL (ref 3.2–4.5)
BUN/CREAT SERPL: 23
CALCIUM SERPL-MCNC: 10.3 MG/DL (ref 8.5–10.1)
CHLORIDE SERPL-SCNC: 101 MMOL/L (ref 98–107)
CO2 SERPL-SCNC: 24 MMOL/L (ref 21–32)
CREAT SERPL-MCNC: 0.79 MG/DL (ref 0.6–1.3)
GFR SERPLBLD BASED ON 1.73 SQ M-ARVRAT: > 60 ML/MIN
GLUCOSE SERPL-MCNC: 104 MG/DL (ref 70–105)
PHOSPHATE SERPL-MCNC: 3.4 MG/DL (ref 2.3–4.7)
POTASSIUM SERPL-SCNC: 4.7 MMOL/L (ref 3.6–5)
SODIUM SERPL-SCNC: 135 MMOL/L (ref 135–145)

## 2019-02-27 PROCEDURE — 36415 COLL VENOUS BLD VENIPUNCTURE: CPT

## 2019-02-27 PROCEDURE — 80069 RENAL FUNCTION PANEL: CPT

## 2019-06-27 ENCOUNTER — HOSPITAL ENCOUNTER (OUTPATIENT)
Dept: HOSPITAL 75 - CATH | Age: 69
LOS: 1 days | Discharge: HOME | End: 2019-06-28
Attending: INTERNAL MEDICINE
Payer: COMMERCIAL

## 2019-06-27 VITALS — DIASTOLIC BLOOD PRESSURE: 109 MMHG | SYSTOLIC BLOOD PRESSURE: 150 MMHG

## 2019-06-27 VITALS — DIASTOLIC BLOOD PRESSURE: 84 MMHG | SYSTOLIC BLOOD PRESSURE: 146 MMHG

## 2019-06-27 VITALS — DIASTOLIC BLOOD PRESSURE: 81 MMHG | SYSTOLIC BLOOD PRESSURE: 160 MMHG

## 2019-06-27 VITALS — DIASTOLIC BLOOD PRESSURE: 90 MMHG | SYSTOLIC BLOOD PRESSURE: 164 MMHG

## 2019-06-27 VITALS — DIASTOLIC BLOOD PRESSURE: 83 MMHG | SYSTOLIC BLOOD PRESSURE: 140 MMHG

## 2019-06-27 VITALS — HEIGHT: 64 IN | WEIGHT: 170 LBS | BODY MASS INDEX: 29.02 KG/M2

## 2019-06-27 VITALS — SYSTOLIC BLOOD PRESSURE: 160 MMHG | DIASTOLIC BLOOD PRESSURE: 121 MMHG

## 2019-06-27 VITALS — DIASTOLIC BLOOD PRESSURE: 93 MMHG | SYSTOLIC BLOOD PRESSURE: 173 MMHG

## 2019-06-27 VITALS — DIASTOLIC BLOOD PRESSURE: 78 MMHG | SYSTOLIC BLOOD PRESSURE: 151 MMHG

## 2019-06-27 VITALS — SYSTOLIC BLOOD PRESSURE: 144 MMHG | DIASTOLIC BLOOD PRESSURE: 75 MMHG

## 2019-06-27 VITALS — DIASTOLIC BLOOD PRESSURE: 86 MMHG | SYSTOLIC BLOOD PRESSURE: 180 MMHG

## 2019-06-27 VITALS — DIASTOLIC BLOOD PRESSURE: 80 MMHG | SYSTOLIC BLOOD PRESSURE: 148 MMHG

## 2019-06-27 VITALS — DIASTOLIC BLOOD PRESSURE: 81 MMHG | SYSTOLIC BLOOD PRESSURE: 152 MMHG

## 2019-06-27 VITALS — DIASTOLIC BLOOD PRESSURE: 79 MMHG | SYSTOLIC BLOOD PRESSURE: 145 MMHG

## 2019-06-27 VITALS — DIASTOLIC BLOOD PRESSURE: 82 MMHG | SYSTOLIC BLOOD PRESSURE: 161 MMHG

## 2019-06-27 DIAGNOSIS — Z79.82: ICD-10-CM

## 2019-06-27 DIAGNOSIS — I25.10: ICD-10-CM

## 2019-06-27 DIAGNOSIS — I70.211: Primary | ICD-10-CM

## 2019-06-27 DIAGNOSIS — Z87.891: ICD-10-CM

## 2019-06-27 DIAGNOSIS — I10: ICD-10-CM

## 2019-06-27 DIAGNOSIS — E78.5: ICD-10-CM

## 2019-06-27 DIAGNOSIS — Z79.899: ICD-10-CM

## 2019-06-27 LAB
ALBUMIN SERPL-MCNC: 4.4 GM/DL (ref 3.2–4.5)
ALP SERPL-CCNC: 101 U/L (ref 40–136)
ALT SERPL-CCNC: 23 U/L (ref 0–55)
APTT BLD: 28 SEC (ref 24–35)
BILIRUB SERPL-MCNC: 0.7 MG/DL (ref 0.1–1)
BUN/CREAT SERPL: 16
CALCIUM SERPL-MCNC: 10.1 MG/DL (ref 8.5–10.1)
CHLORIDE SERPL-SCNC: 106 MMOL/L (ref 98–107)
CO2 SERPL-SCNC: 23 MMOL/L (ref 21–32)
CREAT SERPL-MCNC: 0.75 MG/DL (ref 0.6–1.3)
ERYTHROCYTE [DISTWIDTH] IN BLOOD BY AUTOMATED COUNT: 13.8 % (ref 10–14.5)
GFR SERPLBLD BASED ON 1.73 SQ M-ARVRAT: > 60 ML/MIN
GLUCOSE SERPL-MCNC: 110 MG/DL (ref 70–105)
HCT VFR BLD CALC: 45 % (ref 35–52)
HGB BLD-MCNC: 15.4 G/DL (ref 11.5–16)
INR PPP: 0.9 (ref 0.8–1.4)
MCH RBC QN AUTO: 35 PG (ref 25–34)
MCHC RBC AUTO-ENTMCNC: 34 G/DL (ref 32–36)
MCV RBC AUTO: 101 FL (ref 80–99)
PLATELET # BLD: 221 10^3/UL (ref 130–400)
PMV BLD AUTO: 9.9 FL (ref 7.4–10.4)
POTASSIUM SERPL-SCNC: 4.2 MMOL/L (ref 3.6–5)
PROT SERPL-MCNC: 7.5 GM/DL (ref 6.4–8.2)
PROTHROMBIN TIME: 12.9 SEC (ref 12.2–14.7)
SODIUM SERPL-SCNC: 140 MMOL/L (ref 135–145)
WBC # BLD AUTO: 8.9 10^3/UL (ref 4.3–11)

## 2019-06-27 PROCEDURE — 36415 COLL VENOUS BLD VENIPUNCTURE: CPT

## 2019-06-27 PROCEDURE — 75716 ARTERY X-RAYS ARMS/LEGS: CPT

## 2019-06-27 PROCEDURE — 85730 THROMBOPLASTIN TIME PARTIAL: CPT

## 2019-06-27 PROCEDURE — 37226: CPT

## 2019-06-27 PROCEDURE — 80053 COMPREHEN METABOLIC PANEL: CPT

## 2019-06-27 PROCEDURE — 75625 CONTRAST EXAM ABDOMINL AORTA: CPT

## 2019-06-27 PROCEDURE — 80048 BASIC METABOLIC PNL TOTAL CA: CPT

## 2019-06-27 PROCEDURE — 85610 PROTHROMBIN TIME: CPT

## 2019-06-27 PROCEDURE — 87081 CULTURE SCREEN ONLY: CPT

## 2019-06-27 PROCEDURE — 85027 COMPLETE CBC AUTOMATED: CPT

## 2019-06-27 RX ADMIN — TICAGRELOR SCH MG: 90 TABLET ORAL at 22:46

## 2019-06-27 RX ADMIN — DICYCLOMINE HYDROCHLORIDE SCH EA: 20 TABLET ORAL at 22:48

## 2019-06-27 NOTE — CARDIAC PROCEDURE NOTE-CS/ASA
Pre-Procedure Note


Pre-Op Procedure Note


H&P Reviewed


The H&P was reviewed, patient examined and no changes noted.


Date H&P Reviewed:  Jun 27, 2019


Time H&P Reviewed:  09:00





Conscious Sedation Pre-Proced


Time


09:00





ASA Score


3


For ASA 3 and 4: Consider anesthesia and medical clearance. Also, for patients

with a history of failed moderate sedation consider anesthesia.

















Airway 


 


Lungs 


 


Heart 


 


 ASA score


 


 ASA 1: a normal healthy patient


 


 ASA 2:  a patient with a mild systemic disease (mid diabetes, controlled 

hypertension, obesity 


 


 ASA 3:  a patient with a severe systemic disease that limits activity  (angina,

COPD, prior Myocardial infarction)


 


 ASA 4:  a patient with an incapacitating disease that is a constant threat to 

life (CHF, renal failure)


 


 ASA 5:  a moribund patient not expected to survive 24 hrs.  (ruptured aneurysm)


 


 ASA 6:  a declared brain-dead patient whose organs are being harvested.


 


 For emergent operations, add the letter E after the classification











Mallampati Classification


Grade 1





Sedation Plan


Analgesia, Amnesia, Plan communicated to team members, Discussed options with 

patient/fam, Discussed risks with patient/fam


The patient is an appropriate candidate to undergo the planned procedure, 

sedation, and anesthesia.





The patient immediately re-assessed prior to indication.











KAREN PENA MD             Jun 27, 2019 11:42

## 2019-06-27 NOTE — HISTORY & PHYSICIAL-CARDIOLGY
HPI-Cardiology


Cardiology Consultation:


Date of Consultation


6/27/19


Date of Admission





Attending Physician


KRAEN Calhoun MD


Admitting Physician


Seth Solis MD


Consulting Physician


KAREN CALHOUN MD





HPI:


Time Seen by a Provider:  09:30


Chief Complaint:


Lifestyle limiting right lower extremity claudication


This is a 68-year-old lady with history of CAD/PCI in December 2018.  She 

presented with severe lifestyle limiting claudication with a severely abnormal 

right ANG.





Review of Systems-Cardiology


Review of Systems


Constitutional:  As described under HPI; No As described under HPI, No no 

symptoms reported, No chills, No fever, No lightheadedness


Eyes:  No As described under HPI, No no symptoms reported, No blindness, No 

blurred vision, No contact lenses, No drainage, No decreased acuity, No foreign 

body sensation, No pain, No vision change


Ears/Nose/Throat:  No As described under HPI, No no symptoms reported, No 

chronic hearing loss, No ear discharge, No ear pain, No nasal drainage, No 

ulcerations


Respiratory:  No no symptoms reported; As described under HPI; No As described 

under HPI, No cough, No orthopnea, No shortness of breath, No SOB with excertion


Cardiovascular:  No no symptoms reported; As described under HPI; No As 

described under HPI, No chest pain, No edema, No irregular heart rate, No 

lightheadedness, No palpitations


Gastrointestinal:  No no symptoms reported, No As described under HPI, No 

abdomen distended, No abdominal pain, No blood streaked bowels, No constipation,

No diarrhea, No nausea, No vomiting, No stool coloration changes


Genitourinary:  No As described under HPI, No burning, No dysuria, No discharge,

No frequency, No flank pain, No hematuria, No urgency


Pregnant:  Yes


Pregnant:  No


Skin:  No rash, No skin related problems, No ulcerations


Psychiatric/Neurological:  No anxiety, No depression, No seizure, No focal we

akness, No syncope


Hematologic:  No bleeding abnormalities





PMH-Social-Family Hx


Patient Social History


Alcohol Use:  Regular Use


Recreational Drug Use:  No


Smoking Status:  Former Smoker


Type Used:  Cigarettes


Recent Foreign Travel:  No





Past Medical History


PMH


As described under Assessment.





Allergies and Home Medications


Allergies


Coded Allergies:  


     ibuprofen (Verified  Allergy, Intermediate, HIVES, pt takes ASA at home, 

12/14/18)





Home Medications


Amlodipine Besylate 10 Mg Tablet, 10 MG PO DAILY, (Reported)


Aspirin 81 Mg Tablet.dr, 81 MG PO DAILY, (Reported)


Atorvastatin Calcium 80 Mg Tablet, 80 MG PO HS, (Reported)


Cetirizine HCl 10 Mg Tab.rapdis, 10 MG PO DAILY, (Reported)


Cholecalciferol (Vitamin D3) 2,000 Unit Tablet, 2,000 UNIT PO DAILY, (Reported)


Cranberry Conc/C/Bacill Coag 1 Each Tablet, 2 EACH PO DAILY, (Reported)


Metoprolol Tartrate 100 Mg Tablet, 100 MG PO BID, (Reported)


Ticagrelor 90 Mg Tablet, 90 MG PO BID, (Reported)


Valsartan 160 Mg Tablet, 160 MG PO DAILY, (Reported)





Patient Home Medication List


Home Medication List Reviewed:  Yes





Physical Exam-Cardiology


Physical Exam


Vital Signs/I&O











 6/27/19





 08:04


 


Temp 97.1


 


Pulse 65


 


Resp 16


 


B/P (MAP) 173/93 (119)


 


Pulse Ox 94


 


O2 Delivery Room Air





Capillary Refill :


Constitutional:  appears stated age, AAO x 3; No apparent distress; well-

developed, well-nourished


HEENT:  PERRL; No normal ENT inspection, No TMs normal, No pharynx normal, No 

scleral icterus (R), No scleral icterus (L), No pale conjunctivae (R), No pale 

conjunctivae (L), No photophobia, No TM abnormal (R), No TM abnormal (L), No 

pharyngeal erythema, No tonsillar exudate, No other, No discharge, No EOMI; 

hearing is well preserved; No hard of hearing; oral hygience is good; No 

ulceration, No xanthelasmas are seen


Neck:  No carotid bruit; carotid pulses are 2 + bilaterally


Respiratory:  No accessory muscle use, No respiratory distress, No chest tender,

No chest expansion is symmetric; chest is bilaterally symmetric; No lungs clear 

to percussion; lungs clear to auscultation; No crackles, No rhonchi, No rales, 

No stridor, No wheezing, No pleural rub, No other


Cardiovascular:  regular rate-rhythm; No irregularly irregular, No extra beats, 

No parasternal heave is noted, No JVD, No edema, No bradycardia, No tachycardia,

No point of maximal impulse, No cardiac thrills are palpable; S1 and S2; No 

gallop/S3, No gallop/S4, No diastolic murmur, No systolic murmur, No friction 

rub, No click, No other


Gastrointestinal:  No tender, No soft, No round, No distended, No pulsatile 

mass, No organomegaly, No guarding, No rebound, No tenderness, No hernia, No 

mass, No audible bowel sounds, No abnormal bowel sounds, No abdominal bruits, No

spleenomegaly, No other


Rectal:  deferred


Extremities:  No normal range of motion, No non-tender, No normal inspection, No

pedal edema, No calf tenderness, No normal capillary refill, No pelvis stable, 

No calf tenderness, No inflammation, No pedal edema, No slow capillary refill, 

No swelling, No other, No abrasion, No clubbing, No cyanosis, No ecchymosis, No 

laceration, No no lower extremity edema bilateral, No significant edema, No 

tenderness, No wound


Neurologic/Psychiatric:  no motor/sensory deficits, alert, normal mood/affect, 

oriented x 3, power is 5/5 both on sides


Skin:  No normal color, No warm/dry, No cyanosis, No cool, No diaphoresis, No 

damp, No ecchymosis, No jaundice, No mottled, No pallor, No rash, No 

tattoos/piercings, No ulcerations, No rash on exposed areas, No ulcerations on 

exposed areas, No other





Data Review


Labs


Laboratory Tests


6/27/19 08:02: 


White Blood Count 8.9, Red Blood Count 4.47, Hemoglobin 15.4, Hematocrit 45, 

Mean Corpuscular Volume 101H, Mean Corpuscular Hemoglobin 35H, Mean Corpuscular 

Hemoglobin Concent 34, Red Cell Distribution Width 13.8, Platelet Count 221, 

Mean Platelet Volume 9.9, Prothrombin Time 12.9, INR Comment 0.9, Activated 

Partial Thromboplast Time 28, Sodium Level 140, Potassium Level 4.2, Chloride 

Level 106, Carbon Dioxide Level 23, Anion Gap 11, Blood Urea Nitrogen 12, 

Creatinine 0.75, Estimat Glomerular Filtration Rate > 60, BUN/Creatinine Ratio 

16, Glucose Level 110H, Calcium Level 10.1, Corrected Calcium 9.8, Total 

Bilirubin 0.7, Aspartate Amino Transf (AST/SGOT) 20, Alanine Aminotransferase 

(ALT/SGPT) 23, Alkaline Phosphatase 101, Total Protein 7.5, Albumin 4.4








A/P-Cardiology


Assessment/Admission Diagnosis


Lifestyle limiting claudication,


CAD,


Hyperlipidemia,


Hypertension


Admission Status:  Observation





Plan


Lifestyle limiting claudication in the right lower extremity.  Peripheral 

angiography and intervention is recommended.


CAD: Continue aspirin, Brilinta.


Severe hypertension: Renal angiography will be performed.


Hyperlipidemia: Continue high-dose statin therapy.











KAREN CALHOUN MD            Jun 27, 2019 12:08 pm

## 2019-06-27 NOTE — XMS REPORT
Continuity of Care Document

                             Created on: 2019



MANOJ LORD

External Reference #: [id number]

: 1950

Sex: Female



Demographics







                          Home Phone                [phone]

 

                          Preferred Language        Unknown

 

                          Marital Status            Unknown

 

                          Jain Affiliation     Unknown

 

                          Race                      Unknown

 

                          Ethnic Group              Unknown





Author







                          Organization              Unknown

 

                          Address                   Unknown

 

                          Phone                     [phone]



              



Allergies

      





             Active              Description              Code              Type              Severity

                Reaction              Onset              Reported/Identified              Relationship

 to Patient                             Clinical Status        

 

             Yes              IBUPROFEN                                            MODERATE          

             OTHER                                                                   

 

                Yes              ibuprofen              [id number]              Drug Allergy          

                Moderate              HIVES, pt takes                              2018         

                                                             

 

                Yes              ibuprofen              [id number]              Drug Allergy          

             Unknown              N/A                             2018                        

                                                 



                      



Medications

      





                Medication              Packaging              Start Date              Stop Date      

                    Route               Dosage              Sig        

 

                                      HEPARIN 5,000 units/cc 1cc  vial                        UNITS         

             2018                                                    

    ONCE&1135                  

 

                                                    LABETELOL SYRINGE INJ 20 MG/4CC (NORMODYNE SYRINGE)                 

             MG              2018                                  

                                                    ONCE&1207                  



                    



Problems

      





             Date Dx Coded              Attending              Type              Code              Diagnosis

                                        Diagnosed By        

 

             2018              Ghazal Lobo              401.0              

MALIGNANT ESSENTIAL HYPERTENSION                       

 

                2018              Ghazal Lobo               410.71           

                                        ACUTE MYOCARDIAL INFARCTION, SUBENDOCARDIAL INFARCTION, INITIAL EPISODE OF CARE 

                                                 

 

             2018              Ghazal Lobo              W              I10              ESSENTIAL

 (PRIMARY) HYPERTENSION                                                   

 

             2018              Ghazal Lobo              I21.4              

NON-ST ELEVATION (NSTEMI) MYOCARDIAL INFARCTION                       

 

                2018              NAI OLEA GREER              Ot              E78.5           

                          HYPERLIPIDEMIA, UNSPECIFIED                       

 

                2018              NAI OLEA GREER              Ot              F17.210         

                          NICOTINE DEPENDENCE, CIGARETTES, UNCOMPL                       

 

                2018              NAI OLEA GREER              Ot              I16.0           

                          HYPERTENSIVE URGENCY                       

 

                2018              NAI OLEA GREER              Ot              I21.4           

                          NON-ST ELEVATION (NSTEMI) MYOCARDIAL INF                       

 

                2018              NAI OLEA GREER              Ot              I25.10          

                          ATHSCL HEART DISEASE OF NATIVE CORONARY                        

 

                2018              NAI OLEA GREER              Ot              J44.9           

                          CHRONIC OBSTRUCTIVE PULMONARY DISEASE, U                       

 

                2018              NAI OLEA GREER              Ot              R74.8           

                          ABNORMAL LEVELS OF OTHER SERUM ENZYMES                       

 

             2019              Aisha Woods              272.4              OTHER

 AND UNSPECIFIED HYPERLIPIDEMIA                       

 

             2019              Aisha Woods              300.00                 

                                                 

 

             2019              Peggy, Aisha              W              401.0              MALIGNANT

 ESSENTIAL HYPERTENSION                          

 

             2019              Aisha Woods              W              410.90              ACUTE

 MYOCARDIAL INFARCTION, UNSPECIFIED SITE, EPISODE OF CARE UNSPECIFIED              

         

 

             2019              Aisha Woods              W              780.79              OTHER

 MALAISE AND FATIGUE                             

 

             2019              Aisha Woods              W              E78.5              HYPERLIPIDEMIA,

 UNSPECIFIED                                     

 

             2019              Aisha Woods              W              F41.9              ANXIETY

 DISORDER, UNSPECIFIED                           

 

             2019              Aisha Woods              W              I10              ESSENTIAL

 (PRIMARY) HYPERTENSION                          

 

             2019              Aisha Woods              W              I21.29              ST 

ELEVATION (STEMI) MYOCARDIAL INFARCTION INVOLVING OTHER SITES                      

 

 

             2019              Aisha Woods              W              R53.83              OTHER

 FATIGUE                                         

 

             2019              Aisha Woods              W              V69.8              OTHER

 PROBLEMS RELATED TO LIFESTYLE                       

 

             2019              Aisha Woods              Z72.0              TOBACCO

 USE                                             

 

                2019              JEAN LIRA, KAREN GANT              Ot              I10          

                          ESSENTIAL (PRIMARY) HYPERTENSION                       

 

                2019              JEAN LIRA, KAREN GANT Ot              I10          

                          ESSENTIAL (PRIMARY) HYPERTENSION                       

 

                2019              JEAN LIRA, KAREN GANT              Ot              I10          

                          ESSENTIAL (PRIMARY) HYPERTENSION                       

 

                2019              JEAN LIRA, KAREN GANT              Ot              I10          

                          ESSENTIAL (PRIMARY) HYPERTENSION                       

 

                03/15/2019              JEAN LIRA, KAREN GANT              Ot              I10          

                          ESSENTIAL (PRIMARY) HYPERTENSION                       



                                                                        



Procedures

      





                Code              Description              Performed By              Performed On     

   

 

                                      [id number]                                    DILATION OF 2 COR ART WITH 

2 DRUG-ELUT,                                                   12/15/2018        

 

                                      [id number]                                    MEASURE OF CARDIAC SAMPL   

PRESSURE, L H                                                  12/15/2018        

 

                                      [id number]                                    FLUOROSCOPY OF MULT COR ART

 USING L OSM                                                   12/15/2018        



                      



Results

      





                    Test                Result              Range        









                                        Methicillin resistant Staphylococcus aureus (MRSA) screening culture - 18 

12:50         









                          Methicillin resistant Staphylococcus aureus (MRSA) screening culture              

NEG                                     NRG        









                                        Complete blood count (CBC) with automated white blood cell (WBC) differential - 

18 15:31         









                          Blood leukocytes automated count (number/volume)              12.1 10*3/uL        

                                        4.3-11.0        

 

                          Blood erythrocytes automated count (number/volume)              4.47 10*6/uL      

                                        4.35-5.85        

 

                          Venous blood hemoglobin measurement (mass/volume)              15.3 g/dL          

                                        11.5-16.0        

 

                    Blood hematocrit (volume fraction)              45 %                35-52        

 

                          Automated erythrocyte mean corpuscular volume              100 [foz_us]           

                                        80-99        

 

                                        Automated erythrocyte mean corpuscular hemoglobin (mass per erythrocyte)        

                          34 pg                     25-34        

 

                                        Automated erythrocyte mean corpuscular hemoglobin concentration measurement (mass/volume)

                          34 g/dL                   32-36        

 

                    Automated erythrocyte distribution width ratio              13.7 %              10.0-

14.5        

 

                    Automated blood platelet count (count/volume)              205 10*3/uL              

130-400        

 

                          Automated blood platelet mean volume measurement              10.2 [foz_us]       

                                        7.4-10.4        

 

                    Automated blood neutrophils/100 leukocytes              77 %                42-75     

   

 

                    Automated blood lymphocytes/100 leukocytes              15 %                12-44     

   

 

                    Blood monocytes/100 leukocytes              7 %                 0-12        

 

                    Automated blood eosinophils/100 leukocytes              0 %                 0-10       

 

 

                    Automated blood basophils/100 leukocytes              0 %                 0-10        

 

                          Blood neutrophils automated count (number/volume)              9.3 10*3           

                                        1.8-7.8        

 

                          Blood lymphocytes automated count (number/volume)              1.8 10*3           

                                        1.0-4.0        

 

                    Blood monocytes automated count (number/volume)              0.9 10*3              0.0-

1.0        

 

                    Automated eosinophil count              0.0 10*3/uL              0.0-0.3        

 

                    Automated blood basophil count (count/volume)              0.0 10*3/uL              

0.0-0.1        









                                        PT panel in platelet poor plasma by coagulation assay - 18 15:31         









                          Prothrombin time (PT) in platelet poor plasma by coagulation assay              14.3

 s                                      12.2-14.7        

 

                          INR in platelet poor plasma or blood by coagulation assay              1.1        

                                        0.8-1.4        









                                        Activated partial thromboplastin time (aPTT) in platelet poor plasma bycoagulation

 assay - 18 15:31         









                                        Activated partial thromboplastin time (aPTT) in platelet poor plasma bycoagulation

 assay                    71 s                      24-35        









                                        Comprehensive metabolic panel - 18 15:31         









                          Serum or plasma sodium measurement (moles/volume)              138 mmol/L         

                                        135-145        

 

                          Serum or plasma potassium measurement (moles/volume)              4.0 mmol/L      

                                        3.6-5.0        

 

                          Serum or plasma chloride measurement (moles/volume)              107 mmol/L       

                                                

 

                    Carbon dioxide              22 mmol/L              21-32        

 

                          Serum or plasma anion gap determination (moles/volume)              9 mmol/L      

                                        5-14        

 

                          Serum or plasma urea nitrogen measurement (mass/volume)              10 mg/dL     

                                        7-18        

 

                          Serum or plasma creatinine measurement (mass/volume)              0.67 mg/dL      

                                        0.60-1.30        

 

                    Serum or plasma urea nitrogen/creatinine mass ratio              15                  NRG

        

 

                                        Serum or plasma creatinine measurement with calculation of estimated glomerular 

filtration rate              >                         NRG        

 

                          Serum or plasma glucose measurement (mass/volume)              129 mg/dL          

                                                

 

                          Serum or plasma calcium measurement (mass/volume)              9.0 mg/dL          

                                        8.5-10.1        

 

                          Serum or plasma total bilirubin measurement (mass/volume)              1.3 mg/dL  

                                        0.1-1.0        

 

                                        Serum or plasma alkaline phosphatase measurement (enzymatic activity/volume)    

                          91 U/L                            

 

                                        Serum or plasma aspartate aminotransferase measurement (enzymatic activity/volume)

                          97 U/L                    5-34        

 

                                        Serum or plasma alanine aminotransferase measurement (enzymatic activity/volume)

                          31 U/L                    0-55        

 

                          Serum or plasma protein measurement (mass/volume)              7.0 g/dL           

                                        6.4-8.2        

 

                          Serum or plasma albumin measurement (mass/volume)              4.0 g/dL           

                                        3.2-4.5        

 

                    CALCIUM CORRECTED              9.0 mg/dL              8.5-10.1        









                                        Serum or plasma troponin i.cardiac measurement (mass/volume) - 18 15:31   

      









                          Serum or plasma troponin i.cardiac measurement (mass/volume)              14.15 ng/mL

                                        <0.30        









                                        Serum or plasma lithium measurement (moles/volume) - 18 15:31         









                    BNP level              531.1 pg/mL              <100.0        









                                        Activated partial thromboplastin time (aPTT) in platelet poor plasma bycoagulation

 assay - 18 21:06         









                                        Activated partial thromboplastin time (aPTT) in platelet poor plasma bycoagulation

 assay                    47 s                      24-35        









                                        Complete blood count (CBC) with automated white blood cell (WBC) differential - 

12/15/18 04:33         









                          Blood leukocytes automated count (number/volume)              10.9 10*3/uL        

                                        4.3-11.0        

 

                          Blood erythrocytes automated count (number/volume)              4.02 10*6/uL      

                                        4.35-5.85        

 

                          Venous blood hemoglobin measurement (mass/volume)              13.5 g/dL          

                                        11.5-16.0        

 

                    Blood hematocrit (volume fraction)              41 %                35-52        

 

                          Automated erythrocyte mean corpuscular volume              102 [foz_us]           

                                        80-99        

 

                                        Automated erythrocyte mean corpuscular hemoglobin (mass per erythrocyte)        

                          34 pg                     25-34        

 

                                        Automated erythrocyte mean corpuscular hemoglobin concentration measurement (mass/volume)

                          33 g/dL                   32-36        

 

                    Automated erythrocyte distribution width ratio              13.6 %              10.0-

14.5        

 

                    Automated blood platelet count (count/volume)              180 10*3/uL              

130-400        

 

                          Automated blood platelet mean volume measurement              10.3 [foz_us]       

                                        7.4-10.4        

 

                    Automated blood neutrophils/100 leukocytes              82 %                42-75     

   

 

                    Automated blood lymphocytes/100 leukocytes              9 %                 12-44      

  

 

                    Blood monocytes/100 leukocytes              9 %                 0-12        

 

                    Automated blood eosinophils/100 leukocytes              0 %                 0-10       

 

 

                    Automated blood basophils/100 leukocytes              0 %                 0-10        

 

                          Blood neutrophils automated count (number/volume)              8.9 10*3           

                                        1.8-7.8        

 

                          Blood lymphocytes automated count (number/volume)              1.0 10*3           

                                        1.0-4.0        

 

                    Blood monocytes automated count (number/volume)              1.0 10*3              0.0-

1.0        

 

                    Automated eosinophil count              0.0 10*3/uL              0.0-0.3        

 

                    Automated blood basophil count (count/volume)              0.0 10*3/uL              

0.0-0.1        









                                        Activated partial thromboplastin time (aPTT) in platelet poor plasma bycoagulation

 assay - 12/15/18 04:33         









                                        Activated partial thromboplastin time (aPTT) in platelet poor plasma bycoagulation

 assay                    111 s                     24-35        









                                        Comprehensive metabolic panel - 12/15/18 04:33         









                          Serum or plasma sodium measurement (moles/volume)              137 mmol/L         

                                        135-145        

 

                          Serum or plasma potassium measurement (moles/volume)              4.0 mmol/L      

                                        3.6-5.0        

 

                          Serum or plasma chloride measurement (moles/volume)              108 mmol/L       

                                                

 

                    Carbon dioxide              20 mmol/L              21-32        

 

                          Serum or plasma anion gap determination (moles/volume)              9 mmol/L      

                                        5-14        

 

                          Serum or plasma urea nitrogen measurement (mass/volume)              10 mg/dL     

                                        7-18        

 

                          Serum or plasma creatinine measurement (mass/volume)              0.65 mg/dL      

                                        0.60-1.30        

 

                    Serum or plasma urea nitrogen/creatinine mass ratio              15                  NRG

        

 

                                        Serum or plasma creatinine measurement with calculation of estimated glomerular 

filtration rate              >                         NRG        

 

                          Serum or plasma glucose measurement (mass/volume)              126 mg/dL          

                                                

 

                          Serum or plasma calcium measurement (mass/volume)              8.8 mg/dL          

                                        8.5-10.1        

 

                          Serum or plasma total bilirubin measurement (mass/volume)              1.7 mg/dL  

                                        0.1-1.0        

 

                                        Serum or plasma alkaline phosphatase measurement (enzymatic activity/volume)    

                          75 U/L                            

 

                                        Serum or plasma aspartate aminotransferase measurement (enzymatic activity/volume)

                          69 U/L                    5-34        

 

                                        Serum or plasma alanine aminotransferase measurement (enzymatic activity/volume)

                          26 U/L                    0-55        

 

                          Serum or plasma protein measurement (mass/volume)              6.0 g/dL           

                                        6.4-8.2        

 

                          Serum or plasma albumin measurement (mass/volume)              3.4 g/dL           

                                        3.2-4.5        

 

                    CALCIUM CORRECTED              9.3 mg/dL              8.5-10.1        









                                        Serum or plasma troponin i.cardiac measurement (mass/volume) - 12/15/18 04:33   

      









                          Serum or plasma troponin i.cardiac measurement (mass/volume)              16.19 ng/mL

                                        <0.30        









                                        Serum or plasma lithium measurement (moles/volume) - 12/15/18 04:33         









                    BNP level              618.0 pg/mL              <100.0        









                                        Lipid 1996 panel - 12/15/18 04:33         









                          Serum or plasma triglyceride measurement (mass/volume)              87 mg/dL      

                                        <150        

 

                          Serum or plasma cholesterol measurement (mass/volume)              171 mg/dL      

                                        < 200        

 

                          Serum or plasma cholesterol in HDL measurement (mass/volume)              48 mg/dL

                                        40-60        

 

                          Cholesterol in LDL [mass/volume] in serum or plasma by direct assay              120

 mg/dL                                  1-129        

 

                          Serum or plasma cholesterol in VLDL measurement (mass/volume)              17 mg/dL

                                        5-40        









                                        Automated blood complete blood count (hemogram) panel - 18 05:00         









                          Blood leukocytes automated count (number/volume)              9.4 10*3/uL         

                                        4.3-11.0        

 

                          Blood erythrocytes automated count (number/volume)              3.92 10*6/uL      

                                        4.35-5.85        

 

                          Venous blood hemoglobin measurement (mass/volume)              13.3 g/dL          

                                        11.5-16.0        

 

                    Blood hematocrit (volume fraction)              40 %                35-52        

 

                          Automated erythrocyte mean corpuscular volume              102 [foz_us]           

                                        80-99        

 

                                        Automated erythrocyte mean corpuscular hemoglobin (mass per erythrocyte)        

                          34 pg                     25-34        

 

                                        Automated erythrocyte mean corpuscular hemoglobin concentration measurement (mass/volume)

                          33 g/dL                   32-36        

 

                    Automated erythrocyte distribution width ratio              13.6 %              10.0-

14.5        

 

                    Automated blood platelet count (count/volume)              178 10*3/uL              

130-400        

 

                          Automated blood platelet mean volume measurement              10.4 [foz_us]       

                                        7.4-10.4        









                                        Comprehensive metabolic panel - 18 05:00         









                          Serum or plasma sodium measurement (moles/volume)              138 mmol/L         

                                        135-145        

 

                          Serum or plasma potassium measurement (moles/volume)              4.2 mmol/L      

                                        3.6-5.0        

 

                          Serum or plasma chloride measurement (moles/volume)              108 mmol/L       

                                                

 

                    Carbon dioxide              20 mmol/L              21-32        

 

                          Serum or plasma anion gap determination (moles/volume)              10 mmol/L     

                                        5-14        

 

                          Serum or plasma urea nitrogen measurement (mass/volume)              11 mg/dL     

                                        7-18        

 

                          Serum or plasma creatinine measurement (mass/volume)              0.67 mg/dL      

                                        0.60-1.30        

 

                    Serum or plasma urea nitrogen/creatinine mass ratio              16                  NRG

        

 

                                        Serum or plasma creatinine measurement with calculation of estimated glomerular 

filtration rate              >                         NRG        

 

                          Serum or plasma glucose measurement (mass/volume)              104 mg/dL          

                                                

 

                          Serum or plasma calcium measurement (mass/volume)              9.0 mg/dL          

                                        8.5-10.1        

 

                          Serum or plasma total bilirubin measurement (mass/volume)              1.2 mg/dL  

                                        0.1-1.0        

 

                                        Serum or plasma alkaline phosphatase measurement (enzymatic activity/volume)    

                          81 U/L                            

 

                                        Serum or plasma aspartate aminotransferase measurement (enzymatic activity/volume)

                          47 U/L                    5-34        

 

                                        Serum or plasma alanine aminotransferase measurement (enzymatic activity/volume)

                          35 U/L                    0-55        

 

                          Serum or plasma protein measurement (mass/volume)              6.5 g/dL           

                                        6.4-8.2        

 

                          Serum or plasma albumin measurement (mass/volume)              3.7 g/dL           

                                        3.2-4.5        

 

                    CALCIUM CORRECTED              9.2 mg/dL              8.5-10.1        









                                        Serum or plasma troponin i.cardiac measurement (mass/volume) - 18 05:00   

      









                          Serum or plasma troponin i.cardiac measurement (mass/volume)              13.72 ng/mL

                                        <0.30        









                                        Serum or plasma lithium measurement (moles/volume) - 18 05:00         









                    BNP level              454.8 pg/mL              <100.0        









                                        Thyroid Stimulating Hormone - 19 10:00         









                    TSH                 2.56 mIU/mL              0.32-5.00        









                                        Renal Panel - 04/15/19 09:30         









                    Albumin              4.3 g/dL              3.6-5.1        

 

                    BUN                 13 mg/dL              5-25        

 

                    Calcium              9.8 mg/dL              8.3-10.4        

 

                    Chloride              91 mmol/L                      

 

                    CO2                 19 mEq/L              22-33        

 

                    Creat               0.68 mg/dL              0.50-1.50        

 

                    eGFR                86 mL/min/1.73m2              >59        

 

                    Glucose              114 mg/dL                      

 

                    Phosphorus              3.5 mg/dL              2.5-4.8        

 

                    Potassium              4.7 mmol/L              3.5-5.3        

 

                    Sodium              122 mmol/L              134-148        









                                        Urine Protein/Creat - 19 09:00         









                    MTP                 <7 mg/dL              0-20        

 

                    Urine Creatinine              12.4 mg/dl              0.0-50.0        

 

                    Urine Protein/Creat Ratio              0.55 mg/dL                       









                                        Urine Culture - 19 09:00         









                    PRELIM CULTURE RESULTS              No Growth 24 hours                        

 

                    FINAL CULTURE RESULTS              No Growth 48 hours                        

 

                    MEDIA PLATED              Setup at 14:36 on 2019                        

 

                    CULTURE SOURCE              clean                        









                                        Comprehensive Metabolic Panel - 19 08:30         









                    Albumin              4.2 g/dL              3.6-5.1        

 

                    ALP                 86 U/L                      

 

                    ALT                 35 U/L              6-45        

 

                    Anion Gap              16                  6-14        

 

                    AST                 26 U/L              2-40        

 

                    BUN                 12 mg/dL              5-25        

 

                    Calcium              9.9 mg/dL              8.3-10.4        

 

                    Chloride              98 mmol/L                      

 

                    CO2                 21 mEq/L              22-33        

 

                    Creat               0.68 mg/dL              0.50-1.50        

 

                    eGFR                86 mL/min/1.73m2              >59        

 

                    Globulin              2.4 g/dL              2.3-3.5        

 

                    Glucose              115 mg/dL                      

 

                    Osmo                272                 280-295        

 

                    Potassium              4.3 mmol/L              3.5-5.3        

 

                    Sodium              131 mmol/L              134-148        

 

                    TBil                0.3 mg/dL              0.2-1.2        

 

                    TP                  6.6 g/dL              6.0-8.3        









                                        Osmolality - 19 09:10         









                    Osmolality              283 mOsmol/kg              280-301        









                                        Osmolality, Urine - 19 09:10         









                    Osmolality, Urine              165 mOsmol/kg                       









                                        Thyroid Stimulating Hormone - 19 09:10         









                    TSH                 1.36 mIU/mL              0.32-5.00        









                                        Osmolality, Urine - 19 09:10         









                    OSMOLALITY, URINE              165 MOSMOL/KG                       



                                                                    



Encounters

      





                ACCT No.              Visit Date/Time              Discharge              Status      

                Pt. Type              Provider              Facility              Loc./Unit      

                                        Complaint        

 

                [id number]              2019 12:12:00                                          

             Document Registration                                                                   

 

 

                [id number]              2019 13:52:00              2019 23:59:00              

DIS              Outpatient              BETZY PENA                                   

                                                 

 

                [id number]              2019 12:12:00              2019 23:59:00              

DIS              Outpatient              [facility]                                     

                                                 

 

                [id number]              2019 13:53:00              2019 23:59:00              

DIS              Outpatient              Lita Solisu                                   

                                                 

 

                [id number]              2019 12:34:00              2019 23:59:00              

DIS              Outpatient              [facility]                                   

                                                 

 

                [id number]              04/15/2019 11:31:00              04/15/2019 23:59:00              

DIS              Outpatient              [facility]                                     

                                                 

 

                [id number]              2019 11:50:00              2019 23:59:00              

DIS              Outpatient              A[facility] Woods                                     

                                                 

 

                [id number]              2018 10:01:00              2018 12:15:00              

DIS              Outpatient              Lobo, Ghazal A                                 

                                                 

 

             [id number]              2018 11:59:17                                            Document

 Registration                                                                       

 

                    [id number]              2019 10:00:00              2019 23:59:59      

                CLS              [facility]              KAREN PENA MD              [facility]              CATH                      SEVERE PAD,SEVERE HTN        

 

                    [id number]              2019 12:05:00              2019 23:59:59      

                CLS              Outpatient              KAREN PENA MD              [facility]              LAB                       HYPERTENSION        

 

                    [id number]              2019 08:15:00              2019 23:59:59      

                CLS              Outpatient              KAREN PENA MD              [facility]              RAD                       HTN        

 

                    [id number]              2018 12:46:00              2018 11:39:00      

                DIS              Inpatient              GREER TRIMBLE DO              [facility]              ICU                       NON ST ELEVATION;MI

## 2019-06-27 NOTE — CORONARY ANGIOGRAPHY & PCI
Peripheral Angio & Interv


DATE OF SERVICE: 


6/27/19 





PRIMARY PHYSICIAN:


Seth Solis MD 





PERFORMING INTERVENTIONALIST:


Dr. NATHAN Pena





INDICATION:


Lifestyle limiting right lower extremity claudication.





PREOPERATIVE INDICATION:


Lifestyle limiting right lower extremity claudication.





POSTOPERATIVE DIAGNOSIS:


Right occlusive SFA disease, status post intervention.





HISTORY:


This is a 68-year-old lady with history of CAD/PCI.  She presents with lifestyle

limiting right lower extremity claudication on excellent medical therapy 

including dual antiplatelet therapy, high-dose statin therapy.  Severely 

abnormal right ANG.  She is scheduled for peripheral angiography and possible 

intervention.





PROCEDURE PERFORMED:


1.  Abdominal aortogram.


2.  Nonselective renal angiography.


3.  Bilateral lower extremity runoff.


4.  PTA/intervention to right SFA.





SPECIMENS:


None.





ANESTHESIA:


Conscious sedation.





BLOOD LOSS:


20 mL.





COMPLICATIONS:


None.





CONTRAST USED:


200 ml.





FLUOROSCOPY DOSE:


563 Mgy.





FLUOROSCOPY TIME:


19 minutes.





ANTICOAGULATION:


IV heparin.





DESCRIPTION OF PROCEDURE:


The patient was brought to the cath lab after informed consent was taken. All 

the risks and complications were explained in detail. The patient was draped and

prepped in the usual sterile fashion. Access was gained in the left femoral 

artery with a 5 Cambodian sheath.  A pigtail catheter was advanced over a regular 

guidewire into the mid abdominal aorta and abdominal aortogram was performed.  

The pigtail catheter was then pulled back to the level of just above the 

bifurcation of the aorta and lower extremity runoff was performed.





FINDINGS:


No significant disease in the mid abdominal aorta.


Patent bilateral renal arteries.


No significant disease was noted in the right common iliac artery, external 

iliac artery, common femoral artery.  Severe disease in the proximal SFA with 

occlusive disease in the midsegment of the right SFA with reconstitution in the 

popliteal segment with at least 2 vessel runoff below the knee.


No significant disease in the left common iliac artery.  Mild disease in the 

left external iliac artery.  Moderate to severe disease in the mid to distal 

segment of the left SFA.  Below the knee arteries were not well visualized 

however at least 2 vessel runoff.





RECOMMENDATIONS:


PTA/intervention is recommended to the right SFA.





INTERVENTIONAL DETAILS:


Crossover was performed with the pigtail catheter.  The guidewire was placed in 

the deep femoral artery and we exchanged the sheath for a 6 Cambodian by 45 cm 

sheath.  The tip of the sheath was placed in the right CFA.  The guidewire was 

taken out.  Patient was given Plavix 300 mg by mouth.  IV heparin was given.  

ACT was done once which was 280 seconds.  We then went in with the command 0.018

short tapered guidewire and a mini cross catheter.  The occlusion in the 

mid/distal SFA was crossed and the tip of the guidewire was placed in the 

proximal posterior tibial artery.  The mini cross catheter was taken out.  We 

then performed PTA with a 5.0 x 200 balloon at 12 bhupinder for 3 minutes in the mid 

and distal segment of the right SFA and at 14 bhupinder for 3 minutes in the 

proximal/midsegment.  Post-angiogram showed recanalization of the SFA.  We then 

went ahead and placed a supera 5 mm x 120 mm stent in the distal SFA followed by

supera 5.5 mm x 1 50 mm stent in the mid/proximal SFA.  The ostium of the SFA 

was treated with an absolute pro 6 x 60 mm stent.  At the distal edge of the 

first stent mild to moderate disease was noted which was treated again with a 

5.0 balloon at 6 bhupinder.  The rest of the stented segment was treated with PTA at 

14 bhupinder.  Excellent results.  Left femoral artery was closed with a minx device.


 


CONCLUSION:


Continue dual antiplatelet therapy and secondary prevention measures for 

atherosclerotic disease.


Admit for observation overnight and discharge tomorrow.


 


NATHAN Pena MD, FACP, FACC, [facility]


Vascular Medicine and Endovascular Interventions











KAREN PENA MD             Jun 27, 2019 12:05

## 2019-06-27 NOTE — NUR
Administered pt's home dose of metoprolol, brillinta, and  atorvastatin at 2100 6/27/2019 
per Order to use home med supply

## 2019-06-28 VITALS — DIASTOLIC BLOOD PRESSURE: 82 MMHG | SYSTOLIC BLOOD PRESSURE: 155 MMHG

## 2019-06-28 VITALS — SYSTOLIC BLOOD PRESSURE: 160 MMHG | DIASTOLIC BLOOD PRESSURE: 83 MMHG

## 2019-06-28 VITALS — SYSTOLIC BLOOD PRESSURE: 155 MMHG | DIASTOLIC BLOOD PRESSURE: 78 MMHG

## 2019-06-28 LAB
BUN/CREAT SERPL: 16
CALCIUM SERPL-MCNC: 9.5 MG/DL (ref 8.5–10.1)
CHLORIDE SERPL-SCNC: 103 MMOL/L (ref 98–107)
CO2 SERPL-SCNC: 22 MMOL/L (ref 21–32)
CREAT SERPL-MCNC: 0.69 MG/DL (ref 0.6–1.3)
ERYTHROCYTE [DISTWIDTH] IN BLOOD BY AUTOMATED COUNT: 14 % (ref 10–14.5)
GFR SERPLBLD BASED ON 1.73 SQ M-ARVRAT: > 60 ML/MIN
GLUCOSE SERPL-MCNC: 107 MG/DL (ref 70–105)
HCT VFR BLD CALC: 44 % (ref 35–52)
HGB BLD-MCNC: 14.5 G/DL (ref 11.5–16)
MCH RBC QN AUTO: 33 PG (ref 25–34)
MCHC RBC AUTO-ENTMCNC: 33 G/DL (ref 32–36)
MCV RBC AUTO: 102 FL (ref 80–99)
PLATELET # BLD: 216 10^3/UL (ref 130–400)
PMV BLD AUTO: 10.3 FL (ref 7.4–10.4)
POTASSIUM SERPL-SCNC: 4 MMOL/L (ref 3.6–5)
SODIUM SERPL-SCNC: 137 MMOL/L (ref 135–145)
WBC # BLD AUTO: 12 10^3/UL (ref 4.3–11)

## 2019-06-28 RX ADMIN — TICAGRELOR SCH MG: 90 TABLET ORAL at 08:19

## 2019-06-28 RX ADMIN — DICYCLOMINE HYDROCHLORIDE SCH EA: 20 TABLET ORAL at 08:18

## 2019-06-28 NOTE — DISCHARGE INST-POST CATH
Discharge Inst-CATH/EP


Post Cardiac Cath/EP D/C Inst


Follow Up/Plan


Dr Calhoun in 3-4 weeks


<b>CARDIAC CATH/EP PROCEDURE DISCHARGE INSTRUCTIONS</b>





ACTIVITY





* Go Home directly and rest.


* Limit activity of the leg (or wrist if it was used) for 7 days including 

aerobics, swimming,


   jogging, bicycling, etc.


* Restrict stair-climbing for 7 days if possible, if not, climb up with your 

non-cath leg, then


   bring together on the same step.


* Avoid lifting, pushing, pulling or excessive movement of the affected 

extremity for 7 days.


* Customary sexual activity may be resumed after 2 days-use caution not to use a

position  


   that strains or causes pain to the affected extremity.


* No driving for 24 hours.


* NO SMOKING. 


* Avoid straining for bowel movements for 7 days.


* Gentle walking on level ground is allowed.


* Returning to work will depend on the type of procedure and the results. Your 

doctor will discuss


   this with you.





CALL YOUR DOCTOR FOR ANY OF THE FOLLOWING:





*If bleeding from the puncture site occurs- Apply gentle pressure to site with 

clean cloth and call


   your doctor or EMS.


* If a knot or lump forms under the skin, increases in size, or causes pain.


* If bruising appears to be worsening or moving further down your leg instead of

disappearing.


* Temperature above 101 F.





CARE OF YOUR GROIN INCISION;





* Bruising or purple discoloration of the skin near the puncture site is common.


* You may shower only, no bathtub bathing for 5 days.  Be careful to avoid 

slipping as your


   leg may feel stiff.


* If a closure device was used on your femoral artery, please see the attached 

guide regarding


   care of the device and your leg.


* Leave dressing on FOR 24 hours.





CARE OF YOUR WRIST INCISION;





* Bruising or purple discoloration of the skin near the puncture site is common.


* You may shower.


* DO NOT submerge wrist.


* Leave dressing on FOR 24 hours.











KAREN CALHOUN MD             Jun 28, 2019 08:38

## 2019-06-28 NOTE — CARDIOLOGY DISCHARGE SUMMARY
Diagnosis/Chief Complaint


Date of Admission


6/27/2019


Date of Discharge


6/28/2019


Admission Diagnosis


severe lifestyle limiting right-sided claudication refractory to optimal medical

therapy





Final/Discharge Diagnosis


occlusive right SFA disease, successful intervention.





Chief Complaint/HPI


Chief Complaint/HPI


This is a 68-year-old lady with history of CAD/PCI in December 2018.  She 

presented with severe lifestyle limiting claudication with a severely abnormal 

right ANG.





Discharge Summary


Procedures


occlusive right SFA treated successfully with 3 stents.


Discharge Physical Examination


stable





Hospital Course


Was the Problem List Reviewed?:  Yes


unremarkable.


Pending Labs








Discussion & Recommendations


Discussion


discharge took over 30 minutes to complete.  Discharge instructions were 

discussed at length.  Patient will continue dual antiplatelet therapy.


Follow up appt.:  


Dr. Calhoun in 3-4 weeks.


Dicharge Diet:  Cardiac Diet


Activity as Tolerated:  Yes


Home Medications


Reviewed patient Home Medication Reconciliation performed by pharmacy medication

reconciliations technician and/or nursing.


Patients Allergies have been reviewed.





Discharge


Home Medications:


Reviewed and agree with Discharge Medication list on patient's Discharge 

Instruction sheet


Condition at discharge


stable.


Instructions to patient/family


discussed with the patient.











KAREN CALHOUN MD             Jun 28, 2019 08:36

## 2021-06-03 ENCOUNTER — HOSPITAL ENCOUNTER (OUTPATIENT)
Dept: HOSPITAL 75 - CARD | Age: 71
End: 2021-06-03
Attending: INTERNAL MEDICINE
Payer: COMMERCIAL

## 2021-06-03 DIAGNOSIS — I25.10: ICD-10-CM

## 2021-06-03 DIAGNOSIS — I10: Primary | ICD-10-CM

## 2021-06-03 PROCEDURE — 93306 TTE W/DOPPLER COMPLETE: CPT

## 2022-12-28 ENCOUNTER — HOSPITAL ENCOUNTER (OUTPATIENT)
Dept: HOSPITAL 75 - PREOP | Age: 72
LOS: 6 days | Discharge: HOME | End: 2023-01-03
Attending: SPECIALIST
Payer: MEDICARE

## 2022-12-28 VITALS — BODY MASS INDEX: 28.39 KG/M2 | WEIGHT: 170.42 LBS | HEIGHT: 65 IN

## 2022-12-28 DIAGNOSIS — H25.12: ICD-10-CM

## 2022-12-28 DIAGNOSIS — Z01.818: Primary | ICD-10-CM

## 2023-01-06 ENCOUNTER — HOSPITAL ENCOUNTER (OUTPATIENT)
Dept: HOSPITAL 75 - SDC | Age: 73
Discharge: HOME | End: 2023-01-06
Attending: SPECIALIST
Payer: COMMERCIAL

## 2023-01-06 VITALS — HEIGHT: 65 IN | WEIGHT: 170.42 LBS | BODY MASS INDEX: 28.39 KG/M2

## 2023-01-06 VITALS — DIASTOLIC BLOOD PRESSURE: 97 MMHG | SYSTOLIC BLOOD PRESSURE: 136 MMHG

## 2023-01-06 VITALS — DIASTOLIC BLOOD PRESSURE: 100 MMHG | SYSTOLIC BLOOD PRESSURE: 144 MMHG

## 2023-01-06 DIAGNOSIS — Z95.5: ICD-10-CM

## 2023-01-06 DIAGNOSIS — F17.200: ICD-10-CM

## 2023-01-06 DIAGNOSIS — H25.9: Primary | ICD-10-CM

## 2023-01-06 PROCEDURE — 66984 XCAPSL CTRC RMVL W/O ECP: CPT

## 2023-01-06 RX ADMIN — PHENYLEPHRINE HYDROCHLORIDE SCH ML: 100 SOLUTION/ DROPS OPHTHALMIC at 10:25

## 2023-01-06 RX ADMIN — PHENYLEPHRINE HYDROCHLORIDE SCH ML: 100 SOLUTION/ DROPS OPHTHALMIC at 10:21

## 2023-01-06 RX ADMIN — TROPICAMIDE SCH ML: 10 SOLUTION/ DROPS OPHTHALMIC at 10:25

## 2023-01-06 RX ADMIN — TETRACAINE HYDROCHLORIDE PRN ML: 5 SOLUTION OPHTHALMIC at 10:11

## 2023-01-06 RX ADMIN — TETRACAINE HYDROCHLORIDE PRN ML: 5 SOLUTION OPHTHALMIC at 10:25

## 2023-01-06 RX ADMIN — TROPICAMIDE SCH ML: 10 SOLUTION/ DROPS OPHTHALMIC at 10:16

## 2023-01-06 RX ADMIN — TETRACAINE HYDROCHLORIDE PRN ML: 5 SOLUTION OPHTHALMIC at 10:21

## 2023-01-06 RX ADMIN — PHENYLEPHRINE HYDROCHLORIDE SCH ML: 100 SOLUTION/ DROPS OPHTHALMIC at 10:16

## 2023-01-06 RX ADMIN — TETRACAINE HYDROCHLORIDE PRN ML: 5 SOLUTION OPHTHALMIC at 10:16

## 2023-01-06 RX ADMIN — TROPICAMIDE SCH ML: 10 SOLUTION/ DROPS OPHTHALMIC at 10:21

## 2023-01-06 NOTE — ANESTHESIA-GENERAL POST-OP
MAC


Patient Condition


Mental Status/LOC:  Same as Preop


Cardiovascular:  Satisfactory


Nausea/Vomiting:  Absent


Respiratory:  Satisfactory


Pain:  Controlled


Complications:  Absent





Post Op Complications


Complications


None





Follow Up Care/Instructions


Patient Instructions


None needed.





Anesthesiology Discharge Order


Discharge Order


Patient was doing well after the procedure with no complaints, stable vital 

signs, no apparent adverse anesthesia problems.   


No complications reported per nursing.











DHRUV STOUT DO          Jan 6, 2023 14:03

## 2023-01-06 NOTE — OPHTHALMOLOGIST PRE-OP NOTE
Pre-Operative Progress Note


H&P Reviewed


The H&P was reviewed, patient examined and no changes noted.


Date H&P Reviewed:  Jan 6, 2023


Time H&P Reviewed:  11:00


Pre-Op Dx


Cataract, Left Eye











BRENDA SHERMAN MD              Jan 6, 2023 11:00

## 2023-01-06 NOTE — OPHTHALMOLOGY OPERATIVE REPORT
Cataract removal/placement IOL


PREOPERATIVE DIAGNOSIS:    Cataract Left Eye


POSTOPERATIVE DIAGNOSIS: Cataract Left Eye





PROCEDURE: Cataract removal and placement of posterior chamber implant, left eye





SURGEON: Fernando Sherman 





ANESTHESIA: Topical with sedation





COMPLICATIONS: None





ESTIMATED BLOOD LOSS: Minimal 





DESCRIPTION OF PROCEDURE:


After proper informed consent was obtained, the patient, a 72 female, was taken 

to the Operating Room and the left eye was anesthetized with tetracaine.  The 

left eye was then prepped and draped in the usual manner.  A wire lid speculum 

was placed. A paracentesis was made at the left hand position. Preservative free

lidocaine was injected into the anterior chamber followed by viscoelastic.  A 

clear corneal incision was made in the temporal position. A capsulorrhexis was 

preformed and the central nuclear and cortical material were removed.  The 

posterior capsule was polished and an Stephan 22.0 [id number] was placed into the 

capsular bag. The residual viscoelastic was aspirated and balanced saline 

solution was injected into the anterior chamber.  Moxifloxacin was injected into

the anterior chamber.





The wound was checked and found to be water tight.





The patient tolerated the procedure well without complications.











FERNANDO SHERMAN MD              Jan 6, 2023 11:21

## 2023-01-20 ENCOUNTER — HOSPITAL ENCOUNTER (OUTPATIENT)
Dept: HOSPITAL 75 - SDC | Age: 73
Discharge: HOME | End: 2023-01-20
Attending: SPECIALIST
Payer: MEDICARE

## 2023-01-20 VITALS — DIASTOLIC BLOOD PRESSURE: 71 MMHG | SYSTOLIC BLOOD PRESSURE: 115 MMHG

## 2023-01-20 VITALS — SYSTOLIC BLOOD PRESSURE: 122 MMHG | DIASTOLIC BLOOD PRESSURE: 79 MMHG

## 2023-01-20 VITALS — WEIGHT: 170.42 LBS | HEIGHT: 55 IN | BODY MASS INDEX: 39.44 KG/M2

## 2023-01-20 DIAGNOSIS — F17.200: ICD-10-CM

## 2023-01-20 DIAGNOSIS — Z95.5: ICD-10-CM

## 2023-01-20 DIAGNOSIS — H25.9: Primary | ICD-10-CM

## 2023-01-20 PROCEDURE — 66984 XCAPSL CTRC RMVL W/O ECP: CPT

## 2023-01-20 RX ADMIN — TROPICAMIDE SCH ML: 10 SOLUTION/ DROPS OPHTHALMIC at 09:51

## 2023-01-20 RX ADMIN — PHENYLEPHRINE HYDROCHLORIDE SCH ML: 100 SOLUTION/ DROPS OPHTHALMIC at 09:51

## 2023-01-20 RX ADMIN — TROPICAMIDE SCH ML: 10 SOLUTION/ DROPS OPHTHALMIC at 10:00

## 2023-01-20 RX ADMIN — TETRACAINE HYDROCHLORIDE PRN ML: 5 SOLUTION OPHTHALMIC at 09:58

## 2023-01-20 RX ADMIN — TROPICAMIDE SCH ML: 10 SOLUTION/ DROPS OPHTHALMIC at 09:55

## 2023-01-20 RX ADMIN — PHENYLEPHRINE HYDROCHLORIDE SCH ML: 100 SOLUTION/ DROPS OPHTHALMIC at 09:55

## 2023-01-20 RX ADMIN — PHENYLEPHRINE HYDROCHLORIDE SCH ML: 100 SOLUTION/ DROPS OPHTHALMIC at 10:00

## 2023-01-20 RX ADMIN — TETRACAINE HYDROCHLORIDE PRN ML: 5 SOLUTION OPHTHALMIC at 09:54

## 2023-01-20 RX ADMIN — TETRACAINE HYDROCHLORIDE PRN ML: 5 SOLUTION OPHTHALMIC at 09:46

## 2023-01-20 RX ADMIN — TETRACAINE HYDROCHLORIDE PRN ML: 5 SOLUTION OPHTHALMIC at 09:51

## 2023-01-20 NOTE — OPHTHALMOLOGY OPERATIVE REPORT
Cataract removal/placement IOL


PREOPERATIVE DIAGNOSIS: Cataract Right Eye


POSTOPERATIVE DIAGNOSIS: Cataract Right Eye





PROCEDURE: Cataract removal and placement of posterior chamber implant, right 

eye





SURGEON: Fernando Sherman 





ANESTHESIA: Topical with sedation





COMPLICATIONS: None





ESTIMATED BLOOD LOSS: Minimal 





DESCRIPTION OF PROCEDURE:


After proper informed consent was obtained, the patient, a [id number] female, was taken 

to the Operating Room and the right eye was anesthetized with tetracaine.  The 

right eye was then prepped and draped in the usual manner.  A wire lid speculum 

was placed. A paracentesis was made at the left hand position. Preservative free

lidocaine was injected into the anterior chamber followed by viscoelastic.  A 

clear corneal incision was made in the temporal position. A capsulorrhexis was 

preformed and the central nuclear and cortical material were removed.  The 

posterior capsule was polished and Stephan 22.5 AU00T0  IOL was placed into the 

capsular bag. The residual viscoelastic was aspirated and balanced saline 

solution was injected into the anterior chamber. Moxifloxacin  was injected into

the anterior chamber.





The wound was checked and found to be water tight.





The patient tolerated the procedure well without complications.











FERNANDO SHERMAN MD             Jan 20, 2023 10:50

## 2023-01-20 NOTE — OPHTHALMOLOGIST PRE-OP NOTE
Pre-Operative Progress Note


H&P Reviewed


The H&P was reviewed, patient examined and no changes noted.


Date H&P Reviewed:  Jan 20, 2023


Time H&P Reviewed:  10:31


Pre-Op Dx


Cataract, Right Eye











BRENDA SHERMAN MD             Jan 20, 2023 10:31

## 2023-08-15 NOTE — ANESTHESIA-GENERAL POST-OP
MAC


Patient Condition


Mental Status/LOC:  Same as Preop


Cardiovascular:  Satisfactory


Nausea/Vomiting:  Absent


Respiratory:  Satisfactory


Pain:  Controlled


Complications:  Absent





Post Op Complications


Complications


None





Follow Up Care/Instructions


Patient Instructions


None needed.





Anesthesiology Discharge Order


Discharge Order


Patient is doing well, no complaints, stable vital signs, no apparent adverse 

anesthesia problems.   


No complications reported per nursing.











MANUELA SALAZAR CRNA           Jan 20, 2023 13:08
Private car